# Patient Record
Sex: MALE | Race: WHITE | NOT HISPANIC OR LATINO | Employment: FULL TIME | URBAN - METROPOLITAN AREA
[De-identification: names, ages, dates, MRNs, and addresses within clinical notes are randomized per-mention and may not be internally consistent; named-entity substitution may affect disease eponyms.]

---

## 2018-07-13 ENCOUNTER — OFFICE VISIT (OUTPATIENT)
Dept: ENDOCRINOLOGY | Age: 53
End: 2018-07-13

## 2018-07-13 VITALS
DIASTOLIC BLOOD PRESSURE: 88 MMHG | HEIGHT: 72 IN | BODY MASS INDEX: 42.66 KG/M2 | SYSTOLIC BLOOD PRESSURE: 142 MMHG | WEIGHT: 315 LBS

## 2018-07-13 DIAGNOSIS — E03.9 HYPOTHYROIDISM, UNSPECIFIED TYPE: Primary | ICD-10-CM

## 2018-07-13 DIAGNOSIS — I10 ESSENTIAL HYPERTENSION: ICD-10-CM

## 2018-07-13 DIAGNOSIS — E78.5 HYPERLIPIDEMIA, UNSPECIFIED HYPERLIPIDEMIA TYPE: ICD-10-CM

## 2018-07-13 DIAGNOSIS — R79.89 LOW TESTOSTERONE: ICD-10-CM

## 2018-07-13 DIAGNOSIS — B35.1 TOENAIL FUNGUS: ICD-10-CM

## 2018-07-13 DIAGNOSIS — R63.5 ABNORMAL WEIGHT GAIN: ICD-10-CM

## 2018-07-13 DIAGNOSIS — R73.03 PRE-DIABETES: ICD-10-CM

## 2018-07-13 PROBLEM — R23.4 FISSURE OF SKIN: Status: ACTIVE | Noted: 2018-07-13

## 2018-07-13 PROCEDURE — 99204 OFFICE O/P NEW MOD 45 MIN: CPT | Performed by: NURSE PRACTITIONER

## 2018-07-13 RX ORDER — LEVOTHYROXINE SODIUM 175 UG/1
1 TABLET ORAL DAILY
COMMUNITY
Start: 2018-06-15 | End: 2019-08-30 | Stop reason: SDUPTHER

## 2018-07-13 RX ORDER — CARVEDILOL 25 MG/1
25 TABLET ORAL DAILY
Qty: 30 TABLET | Refills: 5 | Status: SHIPPED | OUTPATIENT
Start: 2018-07-13 | End: 2019-01-07 | Stop reason: SDUPTHER

## 2018-07-13 NOTE — PROGRESS NOTES
"Subjective   Adarsh Davison is a 53 y.o. male is here today for new pt evaluation.   Chief Complaint   Patient presents with   • Thyroid Problem     New Patient, pt is concerned about weight gain.     /88   Ht 182.9 cm (72\")   Wt (!) 151 kg (332 lb)   BMI 45.03 kg/m²   Current Outpatient Prescriptions on File Prior to Visit   Medication Sig   • furosemide (LASIX) 40 MG tablet Take 40 mg by mouth daily.   • lisinopril (PRINIVIL,ZESTRIL) 40 MG tablet Take 40 mg by mouth daily.   • [DISCONTINUED] carvedilol (COREG) 12.5 MG tablet Take 25 mg by mouth Daily.   • [DISCONTINUED] levothyroxine (SYNTHROID, LEVOTHROID) 150 MCG tablet Take 175 mcg by mouth Daily.   • [DISCONTINUED] oxyCODONE-acetaminophen (PERCOCET) 5-325 MG per tablet Take 2 tablets by mouth Every 4 (Four) Hours As Needed (use 1-2 pills for pain).     No current facility-administered medications on file prior to visit.      No family history on file.  Social History   Substance Use Topics   • Smoking status: Never Smoker   • Smokeless tobacco: Never Used   • Alcohol use No     Allergies   Allergen Reactions   • Cialis [Tadalafil] Other (See Comments)     BODY ACHES   • Lortab [Hydrocodone-Acetaminophen] Rash   • Lotrel [Amlodipine Besy-Benazepril Hcl] Rash         History of Present Illness   Encounter Diagnoses   Name Primary?   • Essential hypertension    • Hyperlipidemia, unspecified hyperlipidemia type    • Hypothyroidism, unspecified type Yes   • Low testosterone    • Pre-diabetes    • Abnormal weight gain    • Toenail fungus      This is a 53-year-old male patient here today for initial evaluation of thyroid.  He used to be on testosterone injections.  He is currently on thyroid medication and is taking it as prescribed.  He is noted to be hypertensive at today's visit.  He also has a history of prediabetes and is currently not on any medication for diabetes.  He does have diabetes in his family with his mother.  He also has a positive family " history for cardiovascular disease.  He states he used to get testosterone injections at a physician's office however he had a bill from [employer] $800 and states he can no longer afford testosterone.  We discussed the options of getting himself testosterone injections which would be more affordable.  He's got toenail fungus in both feet and states his sister has been cutting his toenails.  He also is noted to have callus formation and fissures in both feet.  A foot exam was performed at today's visit.  His concern is weight gain and feels he gained approximately 20 pounds recently.  He thinks this is related to his thyroid.  He does complain of fullness in his neck and trouble swallowing.  He does have complaints of fatigue.  His previous medical records have been reviewed.      The following portions of the patient's history were reviewed and updated as appropriate: allergies, current medications, past family history, past medical history, past social history, past surgical history and problem list.    Review of Systems   Constitutional: Positive for fatigue.   HENT: Positive for trouble swallowing.    Eyes: Negative for visual disturbance.   Respiratory: Negative for shortness of breath.    Cardiovascular: Positive for leg swelling.   Endocrine: Negative for polyuria.   Skin: Negative for wound.   Neurological: Negative for numbness.       Objective   Physical Exam   Constitutional: He is oriented to person, place, and time. He appears well-developed and well-nourished. No distress.   obese   HENT:   Head: Normocephalic and atraumatic.   Right Ear: External ear normal.   Left Ear: External ear normal.   Nose: Nose normal.   Eyes: Pupils are equal, round, and reactive to light. Right eye exhibits no discharge. Left eye exhibits no discharge.   Neck: Normal range of motion. Neck supple. Carotid bruit is not present. No tracheal deviation, no edema and no erythema present. Thyromegaly present.    Cardiovascular: Normal rate, regular rhythm, normal heart sounds and intact distal pulses.  Exam reveals no gallop and no friction rub.    No murmur heard.  Pulmonary/Chest: Effort normal and breath sounds normal. No respiratory distress. He has no wheezes. He has no rales.   Abdominal: Soft. Bowel sounds are normal. He exhibits no distension. There is no tenderness.   Musculoskeletal: Normal range of motion. He exhibits no edema or deformity.    Diabetic foot exam performed: exam performed. pt will be referred to podiatry.   During the foot exam he had a monofilament test performed (Decreased sensation at callus formation on both feet).  Vascular Status -  His right foot exhibits abnormal foot edema. His left foot exhibits abnormal foot edema.  Skin Integrity  -  His right foot skin is intact. He has right foot onychomycosis, callous right foot and right heel is dry and cracked.  He has no right foot ulcer and no right foot blister.His left foot skin is intact.He has left foot onychomycosis, callous left foot and left heel dry and cracked. He has no left foot ulcer and no left foot blister..  Lymphadenopathy:     He has no cervical adenopathy.   Neurological: He is alert and oriented to person, place, and time. Coordination normal.   Skin: Skin is warm and dry. No rash noted. He is not diaphoretic. No erythema. No pallor.   Callus and fissures both feet  Skin tan   Psychiatric: He has a normal mood and affect. His behavior is normal. Judgment and thought content normal.   Nursing note and vitals reviewed.    No results found for: TSH, S2WOPLZ, E9PGOAI, THYROIDAB  No results found for: HGBA1C  Lab Results   Component Value Date    GLUCOSE 107 (H) 10/14/2016    BUN 13 10/14/2016    CREATININE 1.05 10/14/2016    EGFRIFNONA 74 10/14/2016    BCR 12.4 10/14/2016    K 4.0 10/14/2016    CO2 25.7 10/14/2016    CALCIUM 9.7 10/14/2016    ALBUMIN 4.20 10/14/2016    LABIL2 1.4 10/14/2016    AST 26 10/14/2016    ALT 34  10/14/2016     No results found for: CHOL, CHLPL, TRIG, HDL, LDL, LDLDIRECT      Assessment/Plan   Problems Addressed this Visit        Cardiovascular and Mediastinum    HTN (hypertension)    Relevant Medications    levothyroxine (SYNTHROID, LEVOTHROID) 175 MCG tablet    carvedilol (COREG) 25 MG tablet    Other Relevant Orders    Ambulatory Referral to Podiatry    Comprehensive Metabolic Panel    C-Peptide    Hemoglobin A1c    Lipid Panel    MicroAlbumin, Urine, Random - Urine, Clean Catch    T3, Free    T4, Free    Thyroid Antibodies    TestT+TestF+SHBG    Thyroid Panel With TSH    Thyroid Peroxidase Antibody    Vitamin D 25 Hydroxy    PSA DIAGNOSTIC    ACTH    Cortisol    FSH & LH    Hyperlipidemia    Relevant Medications    levothyroxine (SYNTHROID, LEVOTHROID) 175 MCG tablet    Other Relevant Orders    Ambulatory Referral to Podiatry    Comprehensive Metabolic Panel    C-Peptide    Hemoglobin A1c    Lipid Panel    MicroAlbumin, Urine, Random - Urine, Clean Catch    T3, Free    T4, Free    Thyroid Antibodies    TestT+TestF+SHBG    Thyroid Panel With TSH    Thyroid Peroxidase Antibody    Vitamin D 25 Hydroxy    PSA DIAGNOSTIC    ACTH    Cortisol    FSH & LH       Endocrine    Hypothyroid - Primary    Relevant Medications    levothyroxine (SYNTHROID, LEVOTHROID) 175 MCG tablet    carvedilol (COREG) 25 MG tablet    Other Relevant Orders    Ambulatory Referral to Podiatry    Comprehensive Metabolic Panel    C-Peptide    Hemoglobin A1c    Lipid Panel    MicroAlbumin, Urine, Random - Urine, Clean Catch    T3, Free    T4, Free    Thyroid Antibodies    TestT+TestF+SHBG    Thyroid Panel With TSH    Thyroid Peroxidase Antibody    Vitamin D 25 Hydroxy    PSA DIAGNOSTIC    ACTH    Cortisol    FSH & LH    Low testosterone    Relevant Medications    levothyroxine (SYNTHROID, LEVOTHROID) 175 MCG tablet    Other Relevant Orders    Ambulatory Referral to Podiatry    Comprehensive Metabolic Panel    C-Peptide    Hemoglobin A1c     Lipid Panel    MicroAlbumin, Urine, Random - Urine, Clean Catch    T3, Free    T4, Free    Thyroid Antibodies    TestT+TestF+SHBG    Thyroid Panel With TSH    Thyroid Peroxidase Antibody    Vitamin D 25 Hydroxy    PSA DIAGNOSTIC    ACTH    Cortisol    FSH & LH       Musculoskeletal and Integument    Toenail fungus       Other    Pre-diabetes    Relevant Medications    levothyroxine (SYNTHROID, LEVOTHROID) 175 MCG tablet    Other Relevant Orders    Ambulatory Referral to Podiatry    Comprehensive Metabolic Panel    C-Peptide    Hemoglobin A1c    Lipid Panel    MicroAlbumin, Urine, Random - Urine, Clean Catch    T3, Free    T4, Free    Thyroid Antibodies    TestT+TestF+SHBG    Thyroid Panel With TSH    Thyroid Peroxidase Antibody    Vitamin D 25 Hydroxy    PSA DIAGNOSTIC    ACTH    Cortisol    FSH & LH    Abnormal weight gain          In summary, patient was seen and examined.  He will have extensive labs done at today's visit will be notified of the results along with any further recommendations.  Currently he is to continue his current thyroid dose of medication.  His blood pressure is not well controlled so his carvedilol has been increased to 25 mg once daily.  He has been asked to monitor his blood pressure.  I've also suggested that he keep a food journal to account for his current caloric intake.  He will be scheduled for a thyroid ultrasound.  He's been referred to podiatry for further evaluation of his feet.  He will follow-up with me in 6 months with labs.  Podiatry referral for fissures and toenail fungus parag and [place]  Thyroid u/s to eval  Monitor bp  Increase carvedilol to 25 mg daily  Labs pending

## 2018-07-13 NOTE — PATIENT INSTRUCTIONS
Podiatry referral for fissures and toenail fungus parag and [place]  Thyroid u/s to eval  Monitor bp  Increase carvedilol to 25 mg daily  Labs pending

## 2018-07-19 PROBLEM — E11.9 CONTROLLED TYPE 2 DIABETES MELLITUS WITHOUT COMPLICATION, WITHOUT LONG-TERM CURRENT USE OF INSULIN (HCC): Status: ACTIVE | Noted: 2018-07-19

## 2018-07-19 LAB
25(OH)D3+25(OH)D2 SERPL-MCNC: 35 NG/ML (ref 30–100)
ACTH PLAS-MCNC: 36.2 PG/ML (ref 7.2–63.3)
ALBUMIN SERPL-MCNC: 4.3 G/DL (ref 3.5–5.2)
ALBUMIN/GLOB SERPL: 1.7 G/DL
ALP SERPL-CCNC: 59 U/L (ref 39–117)
ALT SERPL-CCNC: 25 U/L (ref 1–41)
AST SERPL-CCNC: 24 U/L (ref 1–40)
BILIRUB SERPL-MCNC: 0.4 MG/DL (ref 0.1–1.2)
BUN SERPL-MCNC: 11 MG/DL (ref 6–20)
BUN/CREAT SERPL: 12.8 (ref 7–25)
C PEPTIDE SERPL-MCNC: 14.9 NG/ML (ref 1.1–4.4)
CALCIUM SERPL-MCNC: 9.4 MG/DL (ref 8.6–10.5)
CHLORIDE SERPL-SCNC: 103 MMOL/L (ref 98–107)
CHOLEST SERPL-MCNC: 217 MG/DL (ref 0–200)
CO2 SERPL-SCNC: 24.8 MMOL/L (ref 22–29)
CORTIS SERPL-MCNC: 11.4 UG/DL
CREAT SERPL-MCNC: 0.86 MG/DL (ref 0.76–1.27)
FSH SERPL-ACNC: 4 MIU/ML (ref 1.5–12.4)
FT4I SERPL CALC-MCNC: 2.2 (ref 1.2–4.9)
GLOBULIN SER CALC-MCNC: 2.6 GM/DL
GLUCOSE SERPL-MCNC: 192 MG/DL (ref 65–99)
HBA1C MFR BLD: 6.7 % (ref 4.8–5.6)
HDLC SERPL-MCNC: 33 MG/DL (ref 40–60)
INTERPRETATION: NORMAL
LDLC SERPL CALC-MCNC: 142 MG/DL (ref 0–100)
LH SERPL-ACNC: 5.1 MIU/ML (ref 1.7–8.6)
Lab: NORMAL
MICROALBUMIN UR-MCNC: 14.1 UG/ML
POTASSIUM SERPL-SCNC: 3.8 MMOL/L (ref 3.5–5.2)
PROT SERPL-MCNC: 6.9 G/DL (ref 6–8.5)
PSA SERPL-MCNC: 0.42 NG/ML (ref 0–4)
SHBG SERPL-SCNC: 35.6 NMOL/L (ref 19.3–76.4)
SODIUM SERPL-SCNC: 142 MMOL/L (ref 136–145)
T3FREE SERPL-MCNC: 5.2 PG/ML (ref 2–4.4)
T3RU NFR SERPL: 26 % (ref 24–39)
T4 FREE SERPL-MCNC: 1.37 NG/DL (ref 0.93–1.7)
T4 SERPL-MCNC: 8.4 UG/DL (ref 4.5–12)
TESTOST FREE SERPL-MCNC: 4 PG/ML (ref 7.2–24)
TESTOST SERPL-MCNC: 145 NG/DL (ref 264–916)
THYROGLOB AB SERPL-ACNC: 1.6 IU/ML (ref 0–0.9)
THYROPEROXIDASE AB SERPL-ACNC: 30 IU/ML (ref 0–34)
TRIGL SERPL-MCNC: 211 MG/DL (ref 0–150)
TSH SERPL DL<=0.005 MIU/L-ACNC: 1.7 UIU/ML (ref 0.45–4.5)
VLDLC SERPL CALC-MCNC: 42.2 MG/DL (ref 5–40)

## 2018-07-19 RX ORDER — ROSUVASTATIN CALCIUM 20 MG/1
20 TABLET, COATED ORAL NIGHTLY
Qty: 30 TABLET | Refills: 5 | Status: SHIPPED | OUTPATIENT
Start: 2018-07-19 | End: 2019-01-25

## 2018-07-19 RX ORDER — TESTOSTERONE 40.5 MG/2.5G
GEL TOPICAL
Qty: 75 G | Refills: 0 | OUTPATIENT
Start: 2018-07-19 | End: 2018-07-20

## 2018-07-20 DIAGNOSIS — E03.9 HYPOTHYROIDISM, UNSPECIFIED TYPE: Primary | ICD-10-CM

## 2018-07-20 RX ORDER — TESTOSTERONE CYPIONATE 200 MG/ML
200 INJECTION, SOLUTION INTRAMUSCULAR
Qty: 2 ML | Refills: 0 | OUTPATIENT
Start: 2018-07-20 | End: 2018-10-24 | Stop reason: SDUPTHER

## 2018-07-20 RX ORDER — SYRINGE W-NEEDLE,DISPOSAB,3 ML 25GX5/8"
SYRINGE, EMPTY DISPOSABLE MISCELLANEOUS
Qty: 2 EACH | Refills: 5 | Status: SHIPPED | OUTPATIENT
Start: 2018-07-20 | End: 2019-03-08 | Stop reason: SDUPTHER

## 2018-07-25 ENCOUNTER — TELEPHONE (OUTPATIENT)
Dept: ENDOCRINOLOGY | Age: 53
End: 2018-07-25

## 2018-08-08 ENCOUNTER — TELEPHONE (OUTPATIENT)
Dept: ENDOCRINOLOGY | Age: 53
End: 2018-08-08

## 2018-08-17 ENCOUNTER — HOSPITAL ENCOUNTER (OUTPATIENT)
Dept: ULTRASOUND IMAGING | Facility: HOSPITAL | Age: 53
Discharge: HOME | End: 2018-08-17
Admitting: NURSE PRACTITIONER

## 2018-08-17 DIAGNOSIS — E03.9 HYPOTHYROIDISM, UNSPECIFIED TYPE: ICD-10-CM

## 2018-08-17 PROCEDURE — 76536 US EXAM OF HEAD AND NECK: CPT

## 2018-10-03 ENCOUNTER — TELEPHONE (OUTPATIENT)
Dept: ENDOCRINOLOGY | Age: 53
End: 2018-10-03

## 2018-10-03 NOTE — TELEPHONE ENCOUNTER
----- Message from JUAN Mcgarry sent at 10/3/2018 10:54 AM EDT -----  Contact: Patient  I suggest he see his pcp for cloudy and confusion. That is not a typical side effect of metformin. Any additional medication changes pt should be seen in office   ----- Message -----  From: Kadi Mcknight MA  Sent: 10/3/2018  10:46 AM  To: JUAN Mcgarry        ----- Message -----  From: Molly Zelaya RegSched Rep  Sent: 10/3/2018   9:41 AM  To: BINH Marinelli. Patient is wondering what the dr suggest he do. Stay off of metformin completely, prescribe something else or add a prescription with the metformin.     ----- Message -----  From: Kadi Mcknight MA  Sent: 10/1/2018   1:17 PM  To: Simon Payne    Wondering what?    ----- Message -----  From: Molly Zelaya RegSched Rep  Sent: 9/28/2018   1:50 PM  To: Kadi Mcknight MA    Patient was on metFORMIN (GLUCOPHAGE) 500 MG tablet patient took it for a couple of weeks and started to feel rabia cloudy and confusion and stop taking it for a while. Patient is wondering     Best [phone]              Left patient a message informing him that he should see his PCP for the symptoms listed above. Told the patient that these are not typical side effects of metformin. Told the patient to call the office if he has any questions or concerns.

## 2018-10-24 RX ORDER — TESTOSTERONE CYPIONATE 200 MG/ML
200 INJECTION, SOLUTION INTRAMUSCULAR
Qty: 2 ML | Refills: 0 | OUTPATIENT
Start: 2018-10-24 | End: 2018-10-29 | Stop reason: SDUPTHER

## 2018-10-24 NOTE — TELEPHONE ENCOUNTER
----- Message from Zena Mena sent at 10/24/2018  2:33 PM EDT -----  Contact: patient   Medication :  Testosterone Cypionate (DEPOTESTOTERONE CYPIONATE) 200 MG/ML injection  Message: patient has called in regards to getting the above medication refilled. Please send the medication to the following pharmacy   [address] [facility] [phone]  - [phone]    Patient call back number: [phone]            Refill waiting for approval. Will be called in.

## 2018-10-25 ENCOUNTER — OFFICE VISIT (OUTPATIENT)
Dept: ENDOCRINOLOGY | Age: 53
End: 2018-10-25

## 2018-10-25 VITALS
BODY MASS INDEX: 42.66 KG/M2 | HEIGHT: 72 IN | WEIGHT: 315 LBS | SYSTOLIC BLOOD PRESSURE: 166 MMHG | DIASTOLIC BLOOD PRESSURE: 102 MMHG

## 2018-10-25 DIAGNOSIS — R63.5 ABNORMAL WEIGHT GAIN: ICD-10-CM

## 2018-10-25 DIAGNOSIS — E11.9 CONTROLLED TYPE 2 DIABETES MELLITUS WITHOUT COMPLICATION, WITHOUT LONG-TERM CURRENT USE OF INSULIN (HCC): Primary | ICD-10-CM

## 2018-10-25 DIAGNOSIS — E03.9 HYPOTHYROIDISM, UNSPECIFIED TYPE: ICD-10-CM

## 2018-10-25 DIAGNOSIS — E78.5 HYPERLIPIDEMIA, UNSPECIFIED HYPERLIPIDEMIA TYPE: ICD-10-CM

## 2018-10-25 DIAGNOSIS — I10 ESSENTIAL HYPERTENSION: ICD-10-CM

## 2018-10-25 DIAGNOSIS — R79.89 LOW TESTOSTERONE: ICD-10-CM

## 2018-10-25 DIAGNOSIS — E78.5 DYSLIPIDEMIA WITH HIGH LDL AND LOW HDL: ICD-10-CM

## 2018-10-25 PROBLEM — R91.8 MULTIPLE LUNG NODULES ON CT: Status: ACTIVE | Noted: 2018-09-07

## 2018-10-25 PROCEDURE — 99214 OFFICE O/P EST MOD 30 MIN: CPT | Performed by: NURSE PRACTITIONER

## 2018-10-25 RX ORDER — CLONIDINE HYDROCHLORIDE 0.1 MG/1
0.1 TABLET ORAL 2 TIMES DAILY
Qty: 60 TABLET | Refills: 5 | Status: SHIPPED | OUTPATIENT
Start: 2018-10-25 | End: 2019-04-24 | Stop reason: SDUPTHER

## 2018-10-25 RX ORDER — LISINOPRIL 40 MG/1
40 TABLET ORAL DAILY
Qty: 30 TABLET | Refills: 5 | Status: SHIPPED | OUTPATIENT
Start: 2018-10-25 | End: 2019-07-26 | Stop reason: SDUPTHER

## 2018-10-25 NOTE — PROGRESS NOTES
"Subjective   Adarsh Davison is a 53 y.o. male is here today for follow-up.  Chief Complaint   Patient presents with   • Hypothyroidism     Recent labs   • Hypertension     pt has concerns about metformin, he states that is caused him to be forgetful and confused.    • Hyperlipidemia   • Hypogonadism   • Prediabetes     BP (!) 144/108   Ht 182.9 cm (72\")   Wt (!) 159 kg (349 lb 12.8 oz)   BMI 47.44 kg/m²   Current Outpatient Prescriptions on File Prior to Visit   Medication Sig   • carvedilol (COREG) 25 MG tablet Take 1 tablet by mouth Daily.   • furosemide (LASIX) 40 MG tablet Take 40 mg by mouth daily.   • levothyroxine (SYNTHROID, LEVOTHROID) 175 MCG tablet Take 1 tablet by mouth Daily.   • lisinopril (PRINIVIL,ZESTRIL) 40 MG tablet Take 40 mg by mouth daily.   • rosuvastatin (CRESTOR) 20 MG tablet Take 1 tablet by mouth Every Night.   • Syringe 23G X 1\" 3 ML misc Use as directed for testosterone admin   • Testosterone Cypionate (DEPOTESTOTERONE CYPIONATE) 200 MG/ML injection Inject 1 mL into the appropriate muscle as directed by prescriber Every 14 (Fourteen) Days.   • [DISCONTINUED] metFORMIN (GLUCOPHAGE) 500 MG tablet Take 1 tablet by mouth 2 (Two) Times a Day With Meals.     No current facility-administered medications on file prior to visit.      No family history on file.  Social History   Substance Use Topics   • Smoking status: Never Smoker   • Smokeless tobacco: Never Used   • Alcohol use No     Allergies   Allergen Reactions   • Cialis [Tadalafil] Other (See Comments)     BODY ACHES   • Metformin Confusion   • Lortab [Hydrocodone-Acetaminophen] Rash   • Lotrel [Amlodipine Besy-Benazepril Hcl] Rash         History of Present Illness  Encounter Diagnoses   Name Primary?   • Essential hypertension    • Hyperlipidemia, unspecified hyperlipidemia type    • Controlled type 2 diabetes mellitus without complication, without long-term current use of insulin (CMS/[facility]) Yes   • Hypothyroidism, unspecified type  "   • Dyslipidemia with high LDL and low HDL    • Low testosterone    • Abnormal weight gain    This is a 53-year-old male patient here today for routine follow-up visit.  He is being seen for the above-mentioned problems.  He states he is taking his medications as prescribed.  He has not had recent labs done.  He is recently  within the past year.  His blood pressure is extremely elevated at today's visit.  He is allergic to amlodipine according to his medical record.  His medication list was reviewed and updated at today's visit.  He has overall no complaints.  He has had significant weight gain since his last visit.  He denies any changes in his diet or exercise.  He is questioning whether or not this could be thyroid related.  He also quit taking metformin because he states was causing confusion and since she stopped taking it his mental processes have improved.    The following portions of the patient's history were reviewed and updated as appropriate: allergies, current medications, past family history, past medical history, past social history, past surgical history and problem list.    Review of Systems   Constitutional: Negative for fatigue.   HENT: Negative for trouble swallowing.    Eyes: Negative for visual disturbance.   Respiratory: Negative for shortness of breath.    Cardiovascular: Negative for leg swelling.   Endocrine: Negative for polyuria.   Skin: Negative for wound.   Neurological: Negative for numbness.       Objective   Physical Exam   Constitutional: He is oriented to person, place, and time. He appears well-developed and well-nourished. No distress.   obese   HENT:   Head: Normocephalic and atraumatic.   Right Ear: External ear normal.   Left Ear: External ear normal.   Nose: Nose normal.   Eyes: Pupils are equal, round, and reactive to light. Right eye exhibits no discharge. Left eye exhibits no discharge.   Neck: Normal range of motion. Neck supple. Carotid bruit is not present. No  tracheal deviation, no edema and no erythema present. Thyromegaly present.   Cardiovascular: Normal rate, regular rhythm, normal heart sounds and intact distal pulses.  Exam reveals no gallop and no friction rub.    No murmur heard.  Pulmonary/Chest: Effort normal and breath sounds normal. No respiratory distress. He has no wheezes. He has no rales.   Abdominal: Soft. Bowel sounds are normal. He exhibits no distension. There is no tenderness.   Musculoskeletal: Normal range of motion. He exhibits no edema or deformity.      During the foot exam he had a monofilament test not performed.  Vascular Status -  His right foot exhibits abnormal foot edema. His left foot exhibits abnormal foot edema.  Skin Integrity  -  His right foot skin is intact. He has right foot onychomycosis, callous right foot and right heel is dry and cracked.  He has no right foot ulcer and no right foot blister.His left foot skin is intact.He has left foot onychomycosis, callous left foot and left heel dry and cracked. He has no left foot ulcer and no left foot blister..  Lymphadenopathy:     He has no cervical adenopathy.   Neurological: He is alert and oriented to person, place, and time. Coordination normal.   Skin: Skin is warm and dry. No rash noted. He is not diaphoretic. No erythema. No pallor.   Callus and fissures both feet  Skin tan   Psychiatric: He has a normal mood and affect. His behavior is normal. Judgment and thought content normal.   Nursing note and vitals reviewed.    Office Visit on 07/13/2018   Component Date Value Ref Range Status   • Glucose 07/13/2018 192* 65 - 99 mg/dL Final   • BUN 07/13/2018 11  6 - 20 mg/dL Final   • Creatinine 07/13/2018 0.86  0.76 - 1.27 mg/dL Final   • eGFR Non  Am 07/13/2018 93  >60 mL/min/1.73 Final   • eGFR African Am 07/13/2018 113  >60 mL/min/1.73 Final   • BUN/Creatinine Ratio 07/13/2018 12.8  7.0 - 25.0 Final   • Sodium 07/13/2018 142  136 - 145 mmol/L Final   • Potassium 07/13/2018  3.8  3.5 - 5.2 mmol/L Final   • Chloride 07/13/2018 103  98 - 107 mmol/L Final   • Total CO2 07/13/2018 24.8  22.0 - 29.0 mmol/L Final   • Calcium 07/13/2018 9.4  8.6 - 10.5 mg/dL Final   • Total Protein 07/13/2018 6.9  6.0 - 8.5 g/dL Final   • Albumin 07/13/2018 4.30  3.50 - 5.20 g/dL Final   • Globulin 07/13/2018 2.6  gm/dL Final   • A/G Ratio 07/13/2018 1.7  g/dL Final   • Total Bilirubin 07/13/2018 0.4  0.1 - 1.2 mg/dL Final   • Alkaline Phosphatase 07/13/2018 59  39 - 117 U/L Final   • AST (SGOT) 07/13/2018 24  1 - 40 U/L Final   • ALT (SGPT) 07/13/2018 25  1 - 41 U/L Final   • C-Peptide 07/13/2018 14.9* 1.1 - 4.4 ng/mL Final    C-Peptide reference interval is for fasting patients.   • Hemoglobin A1C 07/13/2018 6.70* 4.80 - 5.60 % Final    Comment: Hemoglobin A1C Ranges:  Increased Risk for Diabetes  5.7% to 6.4%  Diabetes                     >= 6.5%  Diabetic Goal                < 7.0%     • Total Cholesterol 07/13/2018 217* 0 - 200 mg/dL Final   • Triglycerides 07/13/2018 211* 0 - 150 mg/dL Final   • HDL Cholesterol 07/13/2018 33* 40 - 60 mg/dL Final   • VLDL Cholesterol 07/13/2018 42.2* 5 - 40 mg/dL Final   • LDL Cholesterol  07/13/2018 142* 0 - 100 mg/dL Final   • Microalbumin, Urine 07/13/2018 14.1  Not Estab. ug/mL Final   • T3, Free 07/13/2018 5.2* 2.0 - 4.4 pg/mL Final   • Free T4 07/13/2018 1.37  0.93 - 1.70 ng/dL Final   • Thyroid Peroxidase Antibody 07/13/2018 30  0 - 34 IU/mL Final   • Thyroglobulin Ab 07/13/2018 1.6* 0.0 - 0.9 IU/mL Final    Thyroglobulin Antibody measured by [facility] Methodology   • Testosterone, Total 07/13/2018 145* 264 - 916 ng/dL Final    Comment: Adult male reference interval is based on a population of  healthy nonobese males (BMI <30) between 19 and 39 years old.  Tamika et.al. JCEM 2017,102;4662-5241. PMID: 91824547.     • Testosterone, Free 07/13/2018 4.0* 7.2 - 24.0 pg/mL Final   • Sex Hormone Binding Globulin 07/13/2018 35.6  19.3 - 76.4 nmol/L Final   • TSH  07/13/2018 1.700  0.450 - 4.500 uIU/mL Final   • T4, Total 07/13/2018 8.4  4.5 - 12.0 ug/dL Final   • T3 Uptake 07/13/2018 26  24 - 39 % Final   • Free Thyroxine Index 07/13/2018 2.2  1.2 - 4.9 Final   • 25 Hydroxy, Vitamin D 07/13/2018 35.0  30.0 - 100.0 ng/ml Final    Comment: Reference Range for Total Vitamin D 25(OH)  Deficiency    <20.0 ng/mL  Insufficiency 21-29 ng/mL  Sufficiency    ng/mL  Toxicity      >100 ng/ml        • PSA 07/13/2018 0.423  0.000 - 4.000 ng/mL Final   • ACTH 07/13/2018 36.2  7.2 - 63.3 pg/mL Final    ACTH reference interval for samples collected between 7 and 10 AM.   • Cortisol 07/13/2018 11.4  ug/dL Final    Comment:                         Cortisol AM         6.2 - 19.4                          Cortisol PM         2.3 - 11.9     • LH 07/13/2018 5.1  1.7 - 8.6 mIU/mL Final   • FSH 07/13/2018 4.0  1.5 - 12.4 mIU/mL Final   • Interpretation 07/13/2018 Note   Final    Supplemental report is available.   • PDF Image 07/13/2018 Not applicable   Final         Assessment/Plan   Problems Addressed this Visit        Cardiovascular and Mediastinum    HTN (hypertension)    Relevant Medications    lisinopril (PRINIVIL,ZESTRIL) 40 MG tablet    CloNIDine (CATAPRES) 0.1 MG tablet    Other Relevant Orders    Comprehensive Metabolic Panel    C-Peptide    Hemoglobin A1c    Lipid Panel    T3, Free    T4, Free    Thyroid Antibodies    Thyroid Panel With TSH    Thyroid Peroxidase Antibody    Vitamin D 25 Hydroxy    Uric Acid    MicroAlbumin, Urine, Random - Urine, Clean Catch    Hemoglobin & Hematocrit, Blood    TestT+TestF+SHBG    PSA DIAGNOSTIC    Hyperlipidemia    Relevant Medications    lisinopril (PRINIVIL,ZESTRIL) 40 MG tablet    CloNIDine (CATAPRES) 0.1 MG tablet    Other Relevant Orders    Comprehensive Metabolic Panel    C-Peptide    Hemoglobin A1c    Lipid Panel    T3, Free    T4, Free    Thyroid Antibodies    Thyroid Panel With TSH    Thyroid Peroxidase Antibody    Vitamin D 25 Hydroxy     Uric Acid    MicroAlbumin, Urine, Random - Urine, Clean Catch    Hemoglobin & Hematocrit, Blood    TestT+TestF+SHBG    PSA DIAGNOSTIC       Endocrine    Hypothyroid    Relevant Medications    lisinopril (PRINIVIL,ZESTRIL) 40 MG tablet    CloNIDine (CATAPRES) 0.1 MG tablet    Other Relevant Orders    Comprehensive Metabolic Panel    C-Peptide    Hemoglobin A1c    Lipid Panel    T3, Free    T4, Free    Thyroid Antibodies    Thyroid Panel With TSH    Thyroid Peroxidase Antibody    Vitamin D 25 Hydroxy    Uric Acid    MicroAlbumin, Urine, Random - Urine, Clean Catch    Hemoglobin & Hematocrit, Blood    TestT+TestF+SHBG    PSA DIAGNOSTIC    Controlled type 2 diabetes mellitus without complication, without long-term current use of insulin (CMS/[facility]) - Primary    Relevant Medications    lisinopril (PRINIVIL,ZESTRIL) 40 MG tablet    CloNIDine (CATAPRES) 0.1 MG tablet    Other Relevant Orders    Comprehensive Metabolic Panel    C-Peptide    Hemoglobin A1c    Lipid Panel    T3, Free    T4, Free    Thyroid Antibodies    Thyroid Panel With TSH    Thyroid Peroxidase Antibody    Vitamin D 25 Hydroxy    Uric Acid    MicroAlbumin, Urine, Random - Urine, Clean Catch    Hemoglobin & Hematocrit, Blood    TestT+TestF+SHBG    PSA DIAGNOSTIC       Other    Dyslipidemia with high LDL and low HDL    Relevant Medications    lisinopril (PRINIVIL,ZESTRIL) 40 MG tablet    CloNIDine (CATAPRES) 0.1 MG tablet    Other Relevant Orders    Comprehensive Metabolic Panel    C-Peptide    Hemoglobin A1c    Lipid Panel    T3, Free    T4, Free    Thyroid Antibodies    Thyroid Panel With TSH    Thyroid Peroxidase Antibody    Vitamin D 25 Hydroxy    Uric Acid    MicroAlbumin, Urine, Random - Urine, Clean Catch    Hemoglobin & Hematocrit, Blood    TestT+TestF+SHBG    PSA DIAGNOSTIC    Low testosterone    Relevant Medications    lisinopril (PRINIVIL,ZESTRIL) 40 MG tablet    CloNIDine (CATAPRES) 0.1 MG tablet    Other Relevant Orders    Comprehensive Metabolic  Panel    C-Peptide    Hemoglobin A1c    Lipid Panel    T3, Free    T4, Free    Thyroid Antibodies    Thyroid Panel With TSH    Thyroid Peroxidase Antibody    Vitamin D 25 Hydroxy    Uric Acid    MicroAlbumin, Urine, Random - Urine, Clean Catch    Hemoglobin & Hematocrit, Blood    TestT+TestF+SHBG    PSA DIAGNOSTIC    Abnormal weight gain    Relevant Medications    lisinopril (PRINIVIL,ZESTRIL) 40 MG tablet    CloNIDine (CATAPRES) 0.1 MG tablet    Other Relevant Orders    Comprehensive Metabolic Panel    C-Peptide    Hemoglobin A1c    Lipid Panel    T3, Free    T4, Free    Thyroid Antibodies    Thyroid Panel With TSH    Thyroid Peroxidase Antibody    Vitamin D 25 Hydroxy    Uric Acid    MicroAlbumin, Urine, Random - Urine, Clean Catch    Hemoglobin & Hematocrit, Blood    TestT+TestF+SHBG    PSA DIAGNOSTIC        In summary, patient was seen and examined.  He will have extensive labs done at today's visit will be notified of the results along with any further recommendations.  He has been advised to monitor his blood pressure.  I've added Catapres 0.1 mg twice daily.  He's been advised to contact the office if his blood pressure failed to improve.  He will continue all his current medications as prescribed pending his labs.  He is to follow-up in 4 months.   Catapres 0.1 mg twice daily for blood pressure  Monitor bp at home  Labs pending  Be sure you are taking meds as prescribed.

## 2018-10-25 NOTE — PATIENT INSTRUCTIONS
Catapres 0.1 mg twice daily for blood pressure  Monitor bp at home  Labs pending  Be sure you are taking meds as prescribed.

## 2018-10-27 LAB
25(OH)D3+25(OH)D2 SERPL-MCNC: 30.8 NG/ML (ref 30–100)
ALBUMIN SERPL-MCNC: 4.3 G/DL (ref 3.5–5.2)
ALBUMIN/GLOB SERPL: 1.5 G/DL
ALP SERPL-CCNC: 62 U/L (ref 39–117)
ALT SERPL-CCNC: 24 U/L (ref 1–41)
AST SERPL-CCNC: 25 U/L (ref 1–40)
BILIRUB SERPL-MCNC: 0.3 MG/DL (ref 0.1–1.2)
BUN SERPL-MCNC: 12 MG/DL (ref 6–20)
BUN/CREAT SERPL: 12.9 (ref 7–25)
C PEPTIDE SERPL-MCNC: 9.8 NG/ML (ref 1.1–4.4)
CALCIUM SERPL-MCNC: 9.6 MG/DL (ref 8.6–10.5)
CHLORIDE SERPL-SCNC: 101 MMOL/L (ref 98–107)
CHOLEST SERPL-MCNC: 126 MG/DL (ref 0–200)
CO2 SERPL-SCNC: 22.8 MMOL/L (ref 22–29)
CREAT SERPL-MCNC: 0.93 MG/DL (ref 0.76–1.27)
FT4I SERPL CALC-MCNC: 1.8 (ref 1.2–4.9)
GLOBULIN SER CALC-MCNC: 2.9 GM/DL
GLUCOSE SERPL-MCNC: 143 MG/DL (ref 65–99)
HBA1C MFR BLD: 7.5 % (ref 4.8–5.6)
HCT VFR BLD AUTO: 44.8 % (ref 40.4–52.2)
HDLC SERPL-MCNC: 39 MG/DL (ref 40–60)
HGB BLD-MCNC: 14.7 G/DL (ref 13.7–17.6)
INTERPRETATION: NORMAL
LDLC SERPL CALC-MCNC: 49 MG/DL (ref 0–100)
Lab: NORMAL
MICROALBUMIN UR-MCNC: 9.4 UG/ML
POTASSIUM SERPL-SCNC: 4 MMOL/L (ref 3.5–5.2)
PROT SERPL-MCNC: 7.2 G/DL (ref 6–8.5)
PSA SERPL-MCNC: 0.59 NG/ML (ref 0–4)
SHBG SERPL-SCNC: 31.6 NMOL/L (ref 19.3–76.4)
SODIUM SERPL-SCNC: 140 MMOL/L (ref 136–145)
T3FREE SERPL-MCNC: 4 PG/ML (ref 2–4.4)
T3RU NFR SERPL: 26 % (ref 24–39)
T4 FREE SERPL-MCNC: 1.11 NG/DL (ref 0.93–1.7)
T4 SERPL-MCNC: 6.9 UG/DL (ref 4.5–12)
TESTOST FREE SERPL-MCNC: 3.7 PG/ML (ref 7.2–24)
TESTOST SERPL-MCNC: 131 NG/DL (ref 264–916)
THYROGLOB AB SERPL-ACNC: 1.2 IU/ML (ref 0–0.9)
THYROPEROXIDASE AB SERPL-ACNC: 30 IU/ML (ref 0–34)
TRIGL SERPL-MCNC: 190 MG/DL (ref 0–150)
TSH SERPL DL<=0.005 MIU/L-ACNC: 2.47 UIU/ML (ref 0.45–4.5)
URATE SERPL-MCNC: 6.3 MG/DL (ref 3.4–7)
VLDLC SERPL CALC-MCNC: 38 MG/DL (ref 5–40)

## 2018-10-29 PROBLEM — E55.9 VITAMIN D DEFICIENCY: Status: ACTIVE | Noted: 2018-10-29

## 2018-10-29 RX ORDER — ERGOCALCIFEROL 1.25 MG/1
CAPSULE ORAL
Qty: 12 CAPSULE | Refills: 1 | Status: SHIPPED | OUTPATIENT
Start: 2018-10-29 | End: 2019-01-25

## 2018-10-29 RX ORDER — TESTOSTERONE CYPIONATE 200 MG/ML
INJECTION, SOLUTION INTRAMUSCULAR
Qty: 3 ML | Refills: 0 | OUTPATIENT
Start: 2018-10-29 | End: 2019-01-11 | Stop reason: SDUPTHER

## 2018-10-30 ENCOUNTER — TELEPHONE (OUTPATIENT)
Dept: ENDOCRINOLOGY | Age: 53
End: 2018-10-30

## 2018-10-30 NOTE — TELEPHONE ENCOUNTER
Left pt message informing him that A1c has gone up, start glyxambi 10/5 once daily to lower blood sugars. Start vitamin D 50,000 once weekly, continue all current medications, and be sure to take BP meds and monitor it due to BP being high at last visit. Told patient to call the office with any questions or concerns.

## 2019-01-07 RX ORDER — CARVEDILOL 25 MG/1
TABLET ORAL
Qty: 30 TABLET | Refills: 4 | Status: CANCELLED | OUTPATIENT
Start: 2019-01-07

## 2019-01-07 RX ORDER — CARVEDILOL 25 MG/1
25 TABLET ORAL DAILY
Qty: 30 TABLET | Refills: 0 | Status: SHIPPED | OUTPATIENT
Start: 2019-01-07 | End: 2019-01-14 | Stop reason: SDUPTHER

## 2019-01-08 DIAGNOSIS — R79.89 LOW TESTOSTERONE: ICD-10-CM

## 2019-01-08 DIAGNOSIS — E55.9 VITAMIN D DEFICIENCY: ICD-10-CM

## 2019-01-08 DIAGNOSIS — E78.5 HYPERLIPIDEMIA, UNSPECIFIED HYPERLIPIDEMIA TYPE: Primary | ICD-10-CM

## 2019-01-08 DIAGNOSIS — E11.9 CONTROLLED TYPE 2 DIABETES MELLITUS WITHOUT COMPLICATION, WITHOUT LONG-TERM CURRENT USE OF INSULIN (HCC): ICD-10-CM

## 2019-01-11 ENCOUNTER — TELEPHONE (OUTPATIENT)
Dept: ENDOCRINOLOGY | Age: 54
End: 2019-01-11

## 2019-01-11 RX ORDER — TESTOSTERONE CYPIONATE 200 MG/ML
INJECTION, SOLUTION INTRAMUSCULAR
Qty: 3 ML | Refills: 0 | Status: SHIPPED | OUTPATIENT
Start: 2019-01-11 | End: 2019-02-07 | Stop reason: SDUPTHER

## 2019-01-11 NOTE — TELEPHONE ENCOUNTER
rx sent to pharmacy    ----- Message from Pretty Riley sent at 1/11/2019  8:16 AM EST -----  Contact: PT  PT IS NEEDING HIS TESTOSTERONE 200MG/ML INJECT 1CC IM EVERY 10DAYS SENT TO PHARMACY.  PHARMACY IS IN CHART.

## 2019-01-14 RX ORDER — CARVEDILOL 25 MG/1
25 TABLET ORAL DAILY
Qty: 30 TABLET | Refills: 0 | Status: SHIPPED | OUTPATIENT
Start: 2019-01-14 | End: 2019-07-11 | Stop reason: SDUPTHER

## 2019-01-14 RX ORDER — CARVEDILOL 25 MG/1
TABLET ORAL
Qty: 30 TABLET | Refills: 4 | OUTPATIENT
Start: 2019-01-14

## 2019-01-18 ENCOUNTER — LAB (OUTPATIENT)
Dept: ENDOCRINOLOGY | Age: 54
End: 2019-01-18

## 2019-01-18 DIAGNOSIS — E55.9 VITAMIN D DEFICIENCY: ICD-10-CM

## 2019-01-18 DIAGNOSIS — E11.9 CONTROLLED TYPE 2 DIABETES MELLITUS WITHOUT COMPLICATION, WITHOUT LONG-TERM CURRENT USE OF INSULIN (HCC): ICD-10-CM

## 2019-01-18 DIAGNOSIS — E78.5 HYPERLIPIDEMIA, UNSPECIFIED HYPERLIPIDEMIA TYPE: ICD-10-CM

## 2019-01-18 DIAGNOSIS — R79.89 LOW TESTOSTERONE: ICD-10-CM

## 2019-01-19 LAB
25(OH)D3+25(OH)D2 SERPL-MCNC: 26.9 NG/ML (ref 30–100)
ALBUMIN SERPL-MCNC: 3.9 G/DL (ref 3.5–5.2)
ALBUMIN/GLOB SERPL: 1.5 G/DL
ALP SERPL-CCNC: 52 U/L (ref 39–117)
ALT SERPL-CCNC: 20 U/L (ref 1–41)
AST SERPL-CCNC: 20 U/L (ref 1–40)
BILIRUB SERPL-MCNC: 0.3 MG/DL (ref 0.1–1.2)
BUN SERPL-MCNC: 16 MG/DL (ref 6–20)
BUN/CREAT SERPL: 21.1 (ref 7–25)
C PEPTIDE SERPL-MCNC: 12.1 NG/ML (ref 1.1–4.4)
CALCIUM SERPL-MCNC: 9.3 MG/DL (ref 8.6–10.5)
CHLORIDE SERPL-SCNC: 103 MMOL/L (ref 98–107)
CHOLEST SERPL-MCNC: 161 MG/DL (ref 0–200)
CO2 SERPL-SCNC: 27.3 MMOL/L (ref 22–29)
CREAT SERPL-MCNC: 0.76 MG/DL (ref 0.76–1.27)
GLOBULIN SER CALC-MCNC: 2.6 GM/DL
GLUCOSE SERPL-MCNC: 152 MG/DL (ref 65–99)
HBA1C MFR BLD: 7.86 % (ref 4.8–5.6)
HCT VFR BLD AUTO: 45.6 % (ref 40.4–52.2)
HDLC SERPL-MCNC: 34 MG/DL (ref 40–60)
HGB BLD-MCNC: 14.6 G/DL (ref 13.7–17.6)
INTERPRETATION: NORMAL
LDLC SERPL CALC-MCNC: 66 MG/DL (ref 0–100)
Lab: NORMAL
POTASSIUM SERPL-SCNC: 4.6 MMOL/L (ref 3.5–5.2)
PROT SERPL-MCNC: 6.5 G/DL (ref 6–8.5)
PSA SERPL-MCNC: 0.31 NG/ML (ref 0–4)
SHBG SERPL-SCNC: 30.7 NMOL/L (ref 19.3–76.4)
SODIUM SERPL-SCNC: 140 MMOL/L (ref 136–145)
TESTOST FREE SERPL-MCNC: 2.5 PG/ML (ref 7.2–24)
TESTOST SERPL-MCNC: 57 NG/DL (ref 264–916)
TRIGL SERPL-MCNC: 304 MG/DL (ref 0–150)
VLDLC SERPL CALC-MCNC: 60.8 MG/DL (ref 5–40)

## 2019-01-25 ENCOUNTER — OFFICE VISIT (OUTPATIENT)
Dept: ENDOCRINOLOGY | Age: 54
End: 2019-01-25

## 2019-01-25 VITALS
HEIGHT: 72 IN | WEIGHT: 315 LBS | SYSTOLIC BLOOD PRESSURE: 136 MMHG | RESPIRATION RATE: 16 BRPM | BODY MASS INDEX: 42.66 KG/M2 | DIASTOLIC BLOOD PRESSURE: 86 MMHG

## 2019-01-25 DIAGNOSIS — E78.5 HYPERLIPIDEMIA, UNSPECIFIED HYPERLIPIDEMIA TYPE: ICD-10-CM

## 2019-01-25 DIAGNOSIS — I10 ESSENTIAL HYPERTENSION: ICD-10-CM

## 2019-01-25 DIAGNOSIS — E55.9 VITAMIN D DEFICIENCY: ICD-10-CM

## 2019-01-25 DIAGNOSIS — E11.9 CONTROLLED TYPE 2 DIABETES MELLITUS WITHOUT COMPLICATION, WITHOUT LONG-TERM CURRENT USE OF INSULIN (HCC): Primary | ICD-10-CM

## 2019-01-25 DIAGNOSIS — R79.89 LOW TESTOSTERONE: ICD-10-CM

## 2019-01-25 PROCEDURE — 99214 OFFICE O/P EST MOD 30 MIN: CPT | Performed by: NURSE PRACTITIONER

## 2019-01-25 RX ORDER — ROSUVASTATIN CALCIUM 20 MG/1
40 TABLET, COATED ORAL NIGHTLY
Qty: 30 TABLET | Refills: 5 | Status: SHIPPED | OUTPATIENT
Start: 2019-01-25 | End: 2019-08-30 | Stop reason: SDUPTHER

## 2019-01-25 RX ORDER — EMPAGLIFLOZIN 10 MG/1
1 TABLET, FILM COATED ORAL DAILY
Qty: 30 TABLET | Refills: 5 | Status: SHIPPED | OUTPATIENT
Start: 2019-01-25 | End: 2019-08-30

## 2019-01-25 RX ORDER — ERGOCALCIFEROL 1.25 MG/1
CAPSULE ORAL
Qty: 8 CAPSULE | Refills: 5 | Status: SHIPPED | OUTPATIENT
Start: 2019-01-25 | End: 2019-08-30 | Stop reason: SDUPTHER

## 2019-01-25 NOTE — PROGRESS NOTES
"Subjective   Adarsh Davison is a 54 y.o. male is here today for follow-up.  Chief Complaint   Patient presents with   • Diabetes     lab review; not checking BG; laid off work and was off medication x one month. restarted meds x 2 weeks ago; denies problems or concerns   • Hypertension   • Vitamin D Deficiency     /86   Resp 16   Ht 182.9 cm (72\")   Wt (!) 160 kg (352 lb 12.8 oz)   BMI 47.85 kg/m²   Current Outpatient Medications on File Prior to Visit   Medication Sig   • carvedilol (COREG) 25 MG tablet Take 1 tablet by mouth Daily.   • CloNIDine (CATAPRES) 0.1 MG tablet Take 1 tablet by mouth 2 (Two) Times a Day.   • Empagliflozin-Linagliptin (GLYXAMBI) 10-5 MG tablet Take 1 tablet by mouth Daily.   • ergocalciferol (DRISDOL) 45685 units capsule Take 1 po q week   • furosemide (LASIX) 40 MG tablet Take 40 mg by mouth daily.   • levothyroxine (SYNTHROID, LEVOTHROID) 175 MCG tablet Take 1 tablet by mouth Daily.   • lisinopril (PRINIVIL,ZESTRIL) 40 MG tablet Take 1 tablet by mouth Daily.   • rosuvastatin (CRESTOR) 20 MG tablet Take 1 tablet by mouth Every Night.   • Syringe 23G X 1\" 3 ML misc Use as directed for testosterone admin   • Testosterone Cypionate (DEPOTESTOTERONE CYPIONATE) 200 MG/ML injection Take 1 cc IM by injection every 10 days     No current facility-administered medications on file prior to visit.      History reviewed. No pertinent family history.  Social History     Tobacco Use   • Smoking status: Never Smoker   • Smokeless tobacco: Never Used   Substance Use Topics   • Alcohol use: No   • Drug use: No     Allergies   Allergen Reactions   • Cialis [Tadalafil] Other (See Comments)     BODY ACHES   • Metformin Confusion   • Lortab [Hydrocodone-Acetaminophen] Rash   • Lotrel [Amlodipine Besy-Benazepril Hcl] Rash         History of Present Illness  Encounter Diagnoses   Name Primary?   • Essential hypertension    • Hyperlipidemia, unspecified hyperlipidemia type    • Vitamin D deficiency    • " Controlled type 2 diabetes mellitus without complication, without long-term current use of insulin (CMS/[facility]) Yes   • Low testosterone      54 year old male patient being seen today for routine follow up for the above mentioned medical problems. He reports being laid off in December, causing him to temporarily lose his insurance, and because of this, he could not afford his Glyxambi from mid December to mid January. He did start taking it again once his insurance was back in effect. He also reports that he has not been taking his testosterone the past few months. He reports taking all other medications as prescribed. He also reported that he was referred to a podiatrist that he did not care for, and has chosen not to go back to that office. He states that he tries to eat healthy and exercise some, but would like to attend a diabetes class to learn more about healthy eating. He denies symptoms of hypo and hyperglycemia and states that he does not check his blood sugars at home.      The following portions of the patient's history were reviewed and updated as appropriate: allergies, current medications, past family history, past medical history, past social history, past surgical history and problem list.    Review of Systems   Constitutional: Negative for fatigue.   Endocrine: Negative for cold intolerance and heat intolerance.   Psychiatric/Behavioral: Negative for sleep disturbance.       Objective   Physical Exam   Constitutional: He is oriented to person, place, and time. He appears well-developed and well-nourished. No distress.   obesity    HENT:   Head: Normocephalic and atraumatic.   Right Ear: External ear normal.   Left Ear: External ear normal.   Nose: Nose normal.   Eyes: Conjunctivae and EOM are normal. Pupils are equal, round, and reactive to light. Right eye exhibits no discharge. Left eye exhibits no discharge.   Neck: Normal range of motion. Neck supple. Carotid bruit is not present. No tracheal  deviation, no edema and no erythema present. Thyromegaly present.   Cardiovascular: Normal rate, regular rhythm, normal heart sounds and intact distal pulses. Exam reveals no gallop and no friction rub.   No murmur heard.  Pulmonary/Chest: Effort normal and breath sounds normal. No stridor. No respiratory distress. He has no wheezes. He has no rales.   Abdominal: Soft. Bowel sounds are normal. He exhibits no distension. There is no tenderness.   Musculoskeletal: Normal range of motion. He exhibits edema. He exhibits no deformity.   1+ edema to bilateral lower legs and ankles    Adarsh had a diabetic foot exam performed today.   During the foot exam he had a monofilament test performed (decreased sensation bilaterally).  Skin Integrity  -  He has right foot onychomycosis, callous right foot and right heel is dry and cracked.  He has no right foot ulcer and no right foot blister.He has left foot onychomycosis, callous left foot and left heel dry and cracked. He has no left foot ulcer and no left foot blister..  Lymphadenopathy:     He has no cervical adenopathy.   Neurological: He is alert and oriented to person, place, and time. Coordination normal.   Skin: Skin is warm and dry. No rash noted. He is not diaphoretic. No erythema. No pallor.   Calluses on plantar surfaces of feet bilaterally     Psychiatric: He has a normal mood and affect. His behavior is normal. Judgment and thought content normal.   Nursing note and vitals reviewed.    Results for orders placed or performed in visit on 01/18/19   Hemoglobin & Hematocrit, Blood   Result Value Ref Range    Hemoglobin 14.6 13.7 - 17.6 g/dL    Hematocrit 45.6 40.4 - 52.2 %   PSA DIAGNOSTIC   Result Value Ref Range    PSA 0.309 0.000 - 4.000 ng/mL   C-Peptide   Result Value Ref Range    C-Peptide 12.1 (H) 1.1 - 4.4 ng/mL   Vitamin D 25 Hydroxy   Result Value Ref Range    25 Hydroxy, Vitamin D 26.9 (L) 30.0 - 100.0 ng/ml   Hemoglobin A1c   Result Value Ref Range     Hemoglobin A1C 7.86 (H) 4.80 - 5.60 %   Comprehensive Metabolic Panel   Result Value Ref Range    Glucose 152 (H) 65 - 99 mg/dL    BUN 16 6 - 20 mg/dL    Creatinine 0.76 0.76 - 1.27 mg/dL    eGFR Non African Am 107 >60 mL/min/1.73    eGFR African Am 130 >60 mL/min/1.73    BUN/Creatinine Ratio 21.1 7.0 - 25.0    Sodium 140 136 - 145 mmol/L    Potassium 4.6 3.5 - 5.2 mmol/L    Chloride 103 98 - 107 mmol/L    Total CO2 27.3 22.0 - 29.0 mmol/L    Calcium 9.3 8.6 - 10.5 mg/dL    Total Protein 6.5 6.0 - 8.5 g/dL    Albumin 3.90 3.50 - 5.20 g/dL    Globulin 2.6 gm/dL    A/G Ratio 1.5 g/dL    Total Bilirubin 0.3 0.1 - 1.2 mg/dL    Alkaline Phosphatase 52 39 - 117 U/L    AST (SGOT) 20 1 - 40 U/L    ALT (SGPT) 20 1 - 41 U/L   Lipid Panel   Result Value Ref Range    Total Cholesterol 161 0 - 200 mg/dL    Triglycerides 304 (H) 0 - 150 mg/dL    HDL Cholesterol 34 (L) 40 - 60 mg/dL    VLDL Cholesterol 60.8 (H) 5 - 40 mg/dL    LDL Cholesterol  66 0 - 100 mg/dL   TestT+TestF+SHBG   Result Value Ref Range    Testosterone, Total 57 (L) 264 - 916 ng/dL    Testosterone, Free 2.5 (L) 7.2 - 24.0 pg/mL    Sex Hormone Binding Globulin 30.7 19.3 - 76.4 nmol/L   Cardiovascular Risk Assessment   Result Value Ref Range    Interpretation Note    Diabetes Patient Education   Result Value Ref Range    PDF Image Not applicable          Assessment/Plan   Problems Addressed this Visit        Cardiovascular and Mediastinum    HTN (hypertension)    Hyperlipidemia       Digestive    Vitamin D deficiency       Endocrine    Controlled type 2 diabetes mellitus without complication, without long-term current use of insulin (CMS/[facility]) - Primary       Other    Low testosterone        In summary, the patient was seen and examined for the above medical problems. He had recent lab work which was reviewed and a copy was provided to the patient. A Juice was also reviewed today. His blood pressure has improved since his last visit and is within goal range today.  His A1c is above goal, so I have discontinued his Glyxambi and added Jardiance and Trulicity. His Vitamin D was low, so I have increased to Drisdol 50,000 units twice weekly. His triglycerides are elevated so I have increased his Crestor to 40mg daily. I have referred him to a new podiatry office and to diabetes education class. He was instructed on directions for use of Trulicity while in office. He is to begin taking his testosterone again. Continue all other medications as prescribed. He is metabolically stable. He is to return in 6 months with labs prior and he has been encouraged to contact the office should he have any questions or concerns prior to this appointment.       Discontinue Glyxambi  Start Trulicity 0.75mg subcu every week  Start Jardiance 10mg daily  Increase Vit D to twice weekly  Increase Crestor to 40mg  See Mauri for podiatry  Attend diabetes education clas

## 2019-01-25 NOTE — PATIENT INSTRUCTIONS
Discontinue Glyxambi  Start Trulicity 0.75mg subcu every week  Start Jardiance 10mg daily  Increase Vit D to twice weekly  Increase Crestor to 40mg  See Mauri for podiatry  Attend diabetes education class

## 2019-02-07 RX ORDER — CARVEDILOL 25 MG/1
TABLET ORAL
Qty: 30 TABLET | Refills: 4 | Status: SHIPPED | OUTPATIENT
Start: 2019-02-07 | End: 2019-08-30 | Stop reason: SDUPTHER

## 2019-02-07 RX ORDER — TESTOSTERONE CYPIONATE 200 MG/ML
INJECTION, SOLUTION INTRAMUSCULAR
Qty: 3 ML | Refills: 0 | Status: SHIPPED | OUTPATIENT
Start: 2019-02-07 | End: 2019-04-08 | Stop reason: SDUPTHER

## 2019-02-07 RX ORDER — ROSUVASTATIN CALCIUM 20 MG/1
TABLET, COATED ORAL
Qty: 30 TABLET | Refills: 4 | Status: SHIPPED | OUTPATIENT
Start: 2019-02-07 | End: 2019-07-07 | Stop reason: SDUPTHER

## 2019-03-06 ENCOUNTER — HOSPITAL ENCOUNTER (OUTPATIENT)
Dept: DIABETES SERVICES | Facility: HOSPITAL | Age: 54
Setting detail: RECURRING SERIES
Discharge: HOME | End: 2019-03-06

## 2019-03-06 PROCEDURE — G0109 DIAB MANAGE TRN IND/GROUP: HCPCS

## 2019-03-13 ENCOUNTER — HOSPITAL ENCOUNTER (OUTPATIENT)
Dept: DIABETES SERVICES | Facility: HOSPITAL | Age: 54
Setting detail: RECURRING SERIES
Discharge: HOME | End: 2019-03-13

## 2019-03-13 PROCEDURE — G0109 DIAB MANAGE TRN IND/GROUP: HCPCS

## 2019-04-08 ENCOUNTER — TELEPHONE (OUTPATIENT)
Dept: ENDOCRINOLOGY | Age: 54
End: 2019-04-08

## 2019-04-08 RX ORDER — TESTOSTERONE CYPIONATE 200 MG/ML
INJECTION, SOLUTION INTRAMUSCULAR
Qty: 3 ML | Refills: 0 | Status: SHIPPED | OUTPATIENT
Start: 2019-04-08 | End: 2019-05-28 | Stop reason: SDUPTHER

## 2019-04-08 NOTE — TELEPHONE ENCOUNTER
Request has been sent to provider for approval    ----- Message from Simon Howell sent at 4/8/2019 12:24 PM EDT -----  Patient would like to know if he could get a refill sent to [facility] [address] for testosterone vile's and the needles. He can be reached at [phone].    Thanks,  Ashlyn

## 2019-04-24 DIAGNOSIS — R63.5 ABNORMAL WEIGHT GAIN: ICD-10-CM

## 2019-04-24 DIAGNOSIS — R79.89 LOW TESTOSTERONE: ICD-10-CM

## 2019-04-24 DIAGNOSIS — E11.9 CONTROLLED TYPE 2 DIABETES MELLITUS WITHOUT COMPLICATION, WITHOUT LONG-TERM CURRENT USE OF INSULIN (HCC): ICD-10-CM

## 2019-04-24 DIAGNOSIS — E03.9 HYPOTHYROIDISM, UNSPECIFIED TYPE: ICD-10-CM

## 2019-04-24 DIAGNOSIS — E78.5 DYSLIPIDEMIA WITH HIGH LDL AND LOW HDL: ICD-10-CM

## 2019-04-24 DIAGNOSIS — I10 ESSENTIAL HYPERTENSION: ICD-10-CM

## 2019-04-24 DIAGNOSIS — E78.5 HYPERLIPIDEMIA, UNSPECIFIED HYPERLIPIDEMIA TYPE: ICD-10-CM

## 2019-04-24 RX ORDER — CLONIDINE HYDROCHLORIDE 0.1 MG/1
TABLET ORAL
Qty: 60 TABLET | Refills: 4 | Status: SHIPPED | OUTPATIENT
Start: 2019-04-24 | End: 2019-08-30 | Stop reason: SDUPTHER

## 2019-05-26 RX ORDER — TESTOSTERONE CYPIONATE 200 MG/ML
INJECTION, SOLUTION INTRAMUSCULAR
Refills: 0 | Status: CANCELLED | OUTPATIENT
Start: 2019-05-26

## 2019-05-28 RX ORDER — TESTOSTERONE CYPIONATE 200 MG/ML
INJECTION, SOLUTION INTRAMUSCULAR
Qty: 3 ML | Refills: 0 | Status: SHIPPED | OUTPATIENT
Start: 2019-05-28 | End: 2019-07-26 | Stop reason: SDUPTHER

## 2019-06-07 ENCOUNTER — TELEPHONE (OUTPATIENT)
Dept: ENDOCRINOLOGY | Age: 54
End: 2019-06-07

## 2019-06-07 NOTE — TELEPHONE ENCOUNTER
Patient called and stated he needs refill on Trulicity before his company leaves for shut down.  During shut down patients insurance is not effective.

## 2019-07-08 RX ORDER — ROSUVASTATIN CALCIUM 20 MG/1
TABLET, COATED ORAL
Qty: 30 TABLET | Refills: 3 | Status: SHIPPED | OUTPATIENT
Start: 2019-07-08 | End: 2019-08-30 | Stop reason: SDUPTHER

## 2019-07-11 RX ORDER — CARVEDILOL 25 MG/1
25 TABLET ORAL DAILY
Qty: 30 TABLET | Refills: 0 | Status: SHIPPED | OUTPATIENT
Start: 2019-07-11 | End: 2019-08-27 | Stop reason: SDUPTHER

## 2019-07-11 RX ORDER — CARVEDILOL 25 MG/1
TABLET ORAL
Qty: 30 TABLET | Refills: 3 | OUTPATIENT
Start: 2019-07-11

## 2019-07-25 DIAGNOSIS — R79.89 LOW TESTOSTERONE: ICD-10-CM

## 2019-07-25 DIAGNOSIS — E03.9 HYPOTHYROIDISM, UNSPECIFIED TYPE: ICD-10-CM

## 2019-07-25 DIAGNOSIS — E11.9 CONTROLLED TYPE 2 DIABETES MELLITUS WITHOUT COMPLICATION, WITHOUT LONG-TERM CURRENT USE OF INSULIN (HCC): ICD-10-CM

## 2019-07-25 DIAGNOSIS — E78.5 HYPERLIPIDEMIA, UNSPECIFIED HYPERLIPIDEMIA TYPE: ICD-10-CM

## 2019-07-25 DIAGNOSIS — E78.5 DYSLIPIDEMIA WITH HIGH LDL AND LOW HDL: ICD-10-CM

## 2019-07-25 DIAGNOSIS — R63.5 ABNORMAL WEIGHT GAIN: ICD-10-CM

## 2019-07-25 DIAGNOSIS — I10 ESSENTIAL HYPERTENSION: ICD-10-CM

## 2019-07-25 RX ORDER — LISINOPRIL 40 MG/1
TABLET ORAL
Qty: 30 TABLET | Refills: 4 | Status: CANCELLED | OUTPATIENT
Start: 2019-07-25

## 2019-07-25 RX ORDER — TESTOSTERONE CYPIONATE 200 MG/ML
INJECTION, SOLUTION INTRAMUSCULAR
Refills: 0 | Status: CANCELLED | OUTPATIENT
Start: 2019-07-25

## 2019-07-26 DIAGNOSIS — E78.5 DYSLIPIDEMIA WITH HIGH LDL AND LOW HDL: ICD-10-CM

## 2019-07-26 DIAGNOSIS — E78.5 HYPERLIPIDEMIA, UNSPECIFIED HYPERLIPIDEMIA TYPE: ICD-10-CM

## 2019-07-26 DIAGNOSIS — E11.9 CONTROLLED TYPE 2 DIABETES MELLITUS WITHOUT COMPLICATION, WITHOUT LONG-TERM CURRENT USE OF INSULIN (HCC): ICD-10-CM

## 2019-07-26 DIAGNOSIS — E03.9 HYPOTHYROIDISM, UNSPECIFIED TYPE: ICD-10-CM

## 2019-07-26 DIAGNOSIS — R63.5 ABNORMAL WEIGHT GAIN: ICD-10-CM

## 2019-07-26 DIAGNOSIS — I10 ESSENTIAL HYPERTENSION: ICD-10-CM

## 2019-07-26 DIAGNOSIS — R79.89 LOW TESTOSTERONE: ICD-10-CM

## 2019-07-26 RX ORDER — TESTOSTERONE CYPIONATE 200 MG/ML
INJECTION, SOLUTION INTRAMUSCULAR
Qty: 3 ML | Refills: 0 | Status: SHIPPED | OUTPATIENT
Start: 2019-07-26 | End: 2019-08-30

## 2019-07-26 RX ORDER — LISINOPRIL 40 MG/1
40 TABLET ORAL DAILY
Qty: 30 TABLET | Refills: 0 | Status: SHIPPED | OUTPATIENT
Start: 2019-07-26 | End: 2019-08-30 | Stop reason: SDUPTHER

## 2019-08-27 RX ORDER — CARVEDILOL 25 MG/1
TABLET ORAL
Qty: 30 TABLET | Refills: 0 | Status: SHIPPED | OUTPATIENT
Start: 2019-08-27 | End: 2019-08-30 | Stop reason: SDUPTHER

## 2019-08-30 ENCOUNTER — OFFICE VISIT (OUTPATIENT)
Dept: ENDOCRINOLOGY | Age: 54
End: 2019-08-30

## 2019-08-30 VITALS
WEIGHT: 315 LBS | DIASTOLIC BLOOD PRESSURE: 78 MMHG | HEIGHT: 72 IN | BODY MASS INDEX: 42.66 KG/M2 | SYSTOLIC BLOOD PRESSURE: 130 MMHG

## 2019-08-30 DIAGNOSIS — E78.5 DYSLIPIDEMIA WITH HIGH LDL AND LOW HDL: ICD-10-CM

## 2019-08-30 DIAGNOSIS — R73.03 PRE-DIABETES: ICD-10-CM

## 2019-08-30 DIAGNOSIS — R63.5 ABNORMAL WEIGHT GAIN: ICD-10-CM

## 2019-08-30 DIAGNOSIS — E03.9 PRIMARY HYPOTHYROIDISM: ICD-10-CM

## 2019-08-30 DIAGNOSIS — R79.89 LOW TESTOSTERONE: ICD-10-CM

## 2019-08-30 DIAGNOSIS — I10 ESSENTIAL HYPERTENSION: ICD-10-CM

## 2019-08-30 DIAGNOSIS — E03.9 HYPOTHYROIDISM, UNSPECIFIED TYPE: ICD-10-CM

## 2019-08-30 DIAGNOSIS — E78.2 MIXED HYPERLIPIDEMIA: ICD-10-CM

## 2019-08-30 DIAGNOSIS — E78.5 HYPERLIPIDEMIA, UNSPECIFIED HYPERLIPIDEMIA TYPE: ICD-10-CM

## 2019-08-30 DIAGNOSIS — E11.9 CONTROLLED TYPE 2 DIABETES MELLITUS WITHOUT COMPLICATION, WITHOUT LONG-TERM CURRENT USE OF INSULIN (HCC): Primary | ICD-10-CM

## 2019-08-30 DIAGNOSIS — E55.9 VITAMIN D DEFICIENCY: ICD-10-CM

## 2019-08-30 PROCEDURE — 99214 OFFICE O/P EST MOD 30 MIN: CPT | Performed by: NURSE PRACTITIONER

## 2019-08-30 RX ORDER — LISINOPRIL 40 MG/1
40 TABLET ORAL DAILY
Qty: 30 TABLET | Refills: 5 | Status: SHIPPED | OUTPATIENT
Start: 2019-08-30 | End: 2020-04-29

## 2019-08-30 RX ORDER — ROSUVASTATIN CALCIUM 20 MG/1
40 TABLET, COATED ORAL NIGHTLY
Qty: 30 TABLET | Refills: 5 | Status: SHIPPED | OUTPATIENT
Start: 2019-08-30 | End: 2020-06-11 | Stop reason: SDUPTHER

## 2019-08-30 RX ORDER — CARVEDILOL 25 MG/1
25 TABLET ORAL DAILY
Qty: 30 TABLET | Refills: 5 | Status: SHIPPED | OUTPATIENT
Start: 2019-08-30 | End: 2020-04-04

## 2019-08-30 RX ORDER — CLONIDINE HYDROCHLORIDE 0.1 MG/1
0.1 TABLET ORAL 2 TIMES DAILY
Qty: 60 TABLET | Refills: 5 | Status: SHIPPED | OUTPATIENT
Start: 2019-08-30 | End: 2020-05-15

## 2019-08-30 RX ORDER — EMPAGLIFLOZIN 25 MG/1
25 TABLET, FILM COATED ORAL DAILY
Qty: 30 TABLET | Refills: 5 | Status: SHIPPED | OUTPATIENT
Start: 2019-08-30 | End: 2020-05-07

## 2019-08-30 RX ORDER — LEVOTHYROXINE SODIUM 175 UG/1
175 TABLET ORAL DAILY
Qty: 30 TABLET | Refills: 5 | Status: SHIPPED | OUTPATIENT
Start: 2019-08-30 | End: 2020-07-07

## 2019-08-30 RX ORDER — ERGOCALCIFEROL 1.25 MG/1
CAPSULE ORAL
Qty: 8 CAPSULE | Refills: 5 | Status: SHIPPED | OUTPATIENT
Start: 2019-08-30 | End: 2020-03-05

## 2019-08-30 RX ORDER — TESTOSTERONE CYPIONATE 200 MG/ML
INJECTION, SOLUTION INTRAMUSCULAR
Qty: 4 ML | Refills: 0 | Status: SHIPPED | OUTPATIENT
Start: 2019-08-30 | End: 2019-10-11 | Stop reason: SDUPTHER

## 2019-08-30 NOTE — PROGRESS NOTES
"Subjective   Adarsh Davison is a 54 y.o. male is here today for follow-up.  Chief Complaint   Patient presents with   • Diabetes     recent labs, testing BG irregularly, pt did not bring meter   • Hyperlipidemia   • Hypertension   • Hypogonadism   • Vitamin D Deficiency     /78   Ht 182.9 cm (72\")   Wt (!) 153 kg (338 lb)   BMI 45.84 kg/m²   Current Outpatient Medications on File Prior to Visit   Medication Sig   • carvedilol (COREG) 25 MG tablet TAKE ONE TABLET BY MOUTH DAILY   • CloNIDine (CATAPRES) 0.1 MG tablet TAKE ONE TABLET BY MOUTH TWICE A DAY   • Dulaglutide (TRULICITY) 0.75 MG/0.5ML solution pen-injector Inject 0.75 mg under the skin into the appropriate area as directed 1 (One) Time Per Week.   • ergocalciferol (DRISDOL) 94983 units capsule Take 1 po 2 times weekly   • furosemide (LASIX) 40 MG tablet Take 40 mg by mouth daily.   • JARDIANCE 10 MG tablet Take 1 tablet by mouth Daily.   • levothyroxine (SYNTHROID, LEVOTHROID) 175 MCG tablet Take 1 tablet by mouth Daily.   • lisinopril (PRINIVIL,ZESTRIL) 40 MG tablet Take 1 tablet by mouth Daily.   • rosuvastatin (CRESTOR) 20 MG tablet Take 2 tablets by mouth Every Night.   • Syringe/Needle, Disp, (B-D 3CC LUER-BETTY SYR 23GX1\") 23G X 1\" 3 ML misc USE AS DIRECTED FOR TESTOSTERONE ADMINISTRATION   • Testosterone Cypionate (DEPOTESTOTERONE CYPIONATE) 200 MG/ML injection Take 1 cc IM by injection every 10 days   • [DISCONTINUED] carvedilol (COREG) 25 MG tablet TAKE ONE TABLET BY MOUTH DAILY   • [DISCONTINUED] rosuvastatin (CRESTOR) 20 MG tablet TAKE ONE TABLET BY MOUTH ONCE NIGHTLY     No current facility-administered medications on file prior to visit.      History reviewed. No pertinent family history.  Social History     Tobacco Use   • Smoking status: Never Smoker   • Smokeless tobacco: Never Used   Substance Use Topics   • Alcohol use: No   • Drug use: No     Allergies   Allergen Reactions   • Cialis [Tadalafil] Other (See Comments)     BODY ACHES "   • Metformin Confusion   • Lortab [Hydrocodone-Acetaminophen] Rash   • Lotrel [Amlodipine Besy-Benazepril Hcl] Rash         History of Present Illness   Encounter Diagnoses   Name Primary?   • Controlled type 2 diabetes mellitus without complication, without long-term current use of insulin (CMS/[facility]) Yes   • Essential hypertension    • Low testosterone    • Vitamin D deficiency    • Mixed hyperlipidemia    • Primary hypothyroidism    54-year-old male patient here today for routine follow-up visit.  He is being seen for the above diagnoses.  He had recent labs done his primary care provider's office and he brought a copy of his labs to today's visit.  His thyroid hormones are in satisfactory range.  He is taking his medications as prescribed.  He explains that he had his testosterone level checked within a few days of taking a testosterone injection.  His testosterone level was above normal on his most recent labs.  Juice was reviewed at today's visit.  He is requesting refills on his medications.  He did not bring his blood glucose meter has not checking his blood sugars regularly.  He denies any signs and symptoms of hypothyroidism.  He has been successful with some weight loss according to our scales.  He has not had any hypoglycemic events.  He has had no recent which to go to the emergency room      The following portions of the patient's history were reviewed and updated as appropriate: allergies, current medications, past family history, past medical history, past social history, past surgical history and problem list.    Review of Systems   Constitutional: Negative.    Eyes: Negative.    Cardiovascular: Negative.    Endocrine: Negative.    Skin: Negative.    Neurological: Positive for numbness.        Neuropathy in hands and feet       Objective   Physical Exam   Constitutional: He is oriented to person, place, and time. He appears well-developed and well-nourished. No distress.   HENT:   Head:  Normocephalic and atraumatic.   Right Ear: External ear normal.   Left Ear: External ear normal.   Nose: Nose normal.   Eyes: Pupils are equal, round, and reactive to light. Right eye exhibits no discharge. Left eye exhibits no discharge.   Neck: Normal range of motion. Neck supple. Carotid bruit is not present. No tracheal deviation, no edema and no erythema present. Thyromegaly present.   Cardiovascular: Normal rate, regular rhythm, normal heart sounds and intact distal pulses. Exam reveals no gallop and no friction rub.   No murmur heard.  Pulmonary/Chest: Effort normal and breath sounds normal. No respiratory distress. He has no wheezes. He has no rales.   Abdominal: Soft. Bowel sounds are normal. He exhibits no distension. There is no tenderness.   Musculoskeletal: Normal range of motion. He exhibits edema. He exhibits no deformity.   Lymphadenopathy:     He has no cervical adenopathy.   Neurological: He is alert and oriented to person, place, and time. Coordination normal.   Skin: Skin is warm and dry. No rash noted. He is not diaphoretic. No erythema. No pallor.   Psychiatric: He has a normal mood and affect. His behavior is normal. Judgment and thought content normal.   Nursing note and vitals reviewed.      Results for orders placed or performed in visit on 01/18/19   Hemoglobin & Hematocrit, Blood   Result Value Ref Range    Hemoglobin 14.6 13.7 - 17.6 g/dL    Hematocrit 45.6 40.4 - 52.2 %   PSA DIAGNOSTIC   Result Value Ref Range    PSA 0.309 0.000 - 4.000 ng/mL   C-Peptide   Result Value Ref Range    C-Peptide 12.1 (H) 1.1 - 4.4 ng/mL   Vitamin D 25 Hydroxy   Result Value Ref Range    25 Hydroxy, Vitamin D 26.9 (L) 30.0 - 100.0 ng/ml   Hemoglobin A1c   Result Value Ref Range    Hemoglobin A1C 7.86 (H) 4.80 - 5.60 %   Comprehensive Metabolic Panel   Result Value Ref Range    Glucose 152 (H) 65 - 99 mg/dL    BUN 16 6 - 20 mg/dL    Creatinine 0.76 0.76 - 1.27 mg/dL    eGFR Non African Am 107 >60 mL/min/1.73     eGFR African Am 130 >60 mL/min/1.73    BUN/Creatinine Ratio 21.1 7.0 - 25.0    Sodium 140 136 - 145 mmol/L    Potassium 4.6 3.5 - 5.2 mmol/L    Chloride 103 98 - 107 mmol/L    Total CO2 27.3 22.0 - 29.0 mmol/L    Calcium 9.3 8.6 - 10.5 mg/dL    Total Protein 6.5 6.0 - 8.5 g/dL    Albumin 3.90 3.50 - 5.20 g/dL    Globulin 2.6 gm/dL    A/G Ratio 1.5 g/dL    Total Bilirubin 0.3 0.1 - 1.2 mg/dL    Alkaline Phosphatase 52 39 - 117 U/L    AST (SGOT) 20 1 - 40 U/L    ALT (SGPT) 20 1 - 41 U/L   Lipid Panel   Result Value Ref Range    Total Cholesterol 161 0 - 200 mg/dL    Triglycerides 304 (H) 0 - 150 mg/dL    HDL Cholesterol 34 (L) 40 - 60 mg/dL    VLDL Cholesterol 60.8 (H) 5 - 40 mg/dL    LDL Cholesterol  66 0 - 100 mg/dL   TestT+TestF+SHBG   Result Value Ref Range    Testosterone, Total 57 (L) 264 - 916 ng/dL    Testosterone, Free 2.5 (L) 7.2 - 24.0 pg/mL    Sex Hormone Binding Globulin 30.7 19.3 - 76.4 nmol/L   Cardiovascular Risk Assessment   Result Value Ref Range    Interpretation Note    Diabetes Patient Education   Result Value Ref Range    PDF Image Not applicable        Assessment/Plan   Problems Addressed this Visit        Cardiovascular and Mediastinum    HTN (hypertension)    Hyperlipidemia       Digestive    Vitamin D deficiency       Endocrine    Controlled type 2 diabetes mellitus without complication, without long-term current use of insulin (CMS/[facility]) - Primary    Primary hypothyroidism       Other    Low testosterone        Patient was seen and examined.  Clinically metabolically he is stable.  His A1c is at 7.  We will increase his Trulicity to 1.5 mg weekly and increase his Jardiance to 25 mg weekly.  He will continue all his other medications as prescribed.  He will follow-up in this office in 6 months with labs prior externally.  Orders have been placed.  He is been encouraged to contact the office should he have any questions or concerns prior to his next visit.

## 2019-08-30 NOTE — PATIENT INSTRUCTIONS
Increase trulicity to 1.5 mg weekly  Increase jardiance 25 mg daily  Continue all other meds the same   Labs prior to next visit

## 2019-09-08 NOTE — TELEPHONE ENCOUNTER
----- Message from JUAN Mcgarry sent at 8/8/2018 10:30 AM EDT -----  Contact: PATIENT  fmla papers can be filled out (there is a cost) to allow time off work to attend appts routinely without it counting against him. He just needs to bring them in to be done.   ----- Message -----  From: Kadi Mcknight MA  Sent: 8/8/2018  10:21 AM  To: JUAN Mcgarry        ----- Message -----  From: Molly Zelaya RegSched Rep  Sent: 8/8/2018  10:05 AM  To: Kadi Mcknight MA    PATIENT STATED HE WAS JUST IN AND WAS DIAGNOSE WITH DIABETES. PATIENT IS WANTING TO KNOW IF HE CAN GET FMLA FOR DIABETES AND IF SO, WHAT DOES PATIENT NEED TO DO TO FILE FOR IT. PATIENT WOULD LIKE A CALL BACK.    [facility] [phone]        INFORMED PATIENT ABOUT FMLA PAPERS. PATIENT EXPRESSED UNDERSTANDING.    .

## 2019-09-09 ENCOUNTER — PRIOR AUTHORIZATION (OUTPATIENT)
Dept: ENDOCRINOLOGY | Age: 54
End: 2019-09-09

## 2019-09-10 RX ORDER — EMPAGLIFLOZIN 10 MG/1
TABLET, FILM COATED ORAL
Qty: 30 TABLET | Refills: 4 | Status: SHIPPED | OUTPATIENT
Start: 2019-09-10 | End: 2019-09-16

## 2019-09-16 ENCOUNTER — HOSPITAL ENCOUNTER (EMERGENCY)
Facility: HOSPITAL | Age: 54
Discharge: HOME | End: 2019-09-16
Attending: EMERGENCY MEDICINE | Admitting: EMERGENCY MEDICINE

## 2019-09-16 ENCOUNTER — APPOINTMENT (OUTPATIENT)
Dept: CT IMAGING | Facility: HOSPITAL | Age: 54
End: 2019-09-16

## 2019-09-16 VITALS
SYSTOLIC BLOOD PRESSURE: 140 MMHG | WEIGHT: 315 LBS | TEMPERATURE: 97.4 F | RESPIRATION RATE: 16 BRPM | DIASTOLIC BLOOD PRESSURE: 104 MMHG | HEART RATE: 72 BPM | OXYGEN SATURATION: 93 % | HEIGHT: 72 IN | BODY MASS INDEX: 42.66 KG/M2

## 2019-09-16 DIAGNOSIS — R10.31 RIGHT LOWER QUADRANT ABDOMINAL PAIN: ICD-10-CM

## 2019-09-16 DIAGNOSIS — I10 ESSENTIAL HYPERTENSION: ICD-10-CM

## 2019-09-16 DIAGNOSIS — S39.012A LUMBAR STRAIN, INITIAL ENCOUNTER: Primary | ICD-10-CM

## 2019-09-16 LAB
ALBUMIN SERPL-MCNC: 4 G/DL (ref 3.5–5.2)
ALBUMIN/GLOB SERPL: 1.3 G/DL
ALP SERPL-CCNC: 54 U/L (ref 39–117)
ALT SERPL W P-5'-P-CCNC: 17 U/L (ref 1–41)
ANION GAP SERPL CALCULATED.3IONS-SCNC: 11.2 MMOL/L (ref 5–15)
AST SERPL-CCNC: 20 U/L (ref 1–40)
BACTERIA UR QL AUTO: NORMAL /HPF
BASOPHILS # BLD AUTO: 0.04 10*3/MM3 (ref 0–0.2)
BASOPHILS NFR BLD AUTO: 0.4 % (ref 0–1.5)
BILIRUB SERPL-MCNC: 0.3 MG/DL (ref 0.2–1.2)
BILIRUB UR QL STRIP: NEGATIVE
BUN BLD-MCNC: 10 MG/DL (ref 6–20)
BUN/CREAT SERPL: 10.1 (ref 7–25)
CALCIUM SPEC-SCNC: 9.2 MG/DL (ref 8.6–10.5)
CHLORIDE SERPL-SCNC: 101 MMOL/L (ref 98–107)
CLARITY UR: CLEAR
CO2 SERPL-SCNC: 24.8 MMOL/L (ref 22–29)
COLOR UR: YELLOW
CREAT BLD-MCNC: 0.99 MG/DL (ref 0.76–1.27)
DEPRECATED RDW RBC AUTO: 57.9 FL (ref 37–54)
EOSINOPHIL # BLD AUTO: 0.42 10*3/MM3 (ref 0–0.4)
EOSINOPHIL NFR BLD AUTO: 4.7 % (ref 0.3–6.2)
ERYTHROCYTE [DISTWIDTH] IN BLOOD BY AUTOMATED COUNT: 19.4 % (ref 12.3–15.4)
GFR SERPL CREATININE-BSD FRML MDRD: 79 ML/MIN/1.73
GLOBULIN UR ELPH-MCNC: 3.1 GM/DL
GLUCOSE BLD-MCNC: 187 MG/DL (ref 65–99)
GLUCOSE UR STRIP-MCNC: ABNORMAL MG/DL
HCT VFR BLD AUTO: 55.5 % (ref 37.5–51)
HGB BLD-MCNC: 17.8 G/DL (ref 13–17.7)
HGB UR QL STRIP.AUTO: ABNORMAL
HYALINE CASTS UR QL AUTO: NORMAL /LPF
IMM GRANULOCYTES # BLD AUTO: 0.02 10*3/MM3 (ref 0–0.05)
IMM GRANULOCYTES NFR BLD AUTO: 0.2 % (ref 0–0.5)
KETONES UR QL STRIP: NEGATIVE
LEUKOCYTE ESTERASE UR QL STRIP.AUTO: NEGATIVE
LIPASE SERPL-CCNC: 40 U/L (ref 13–60)
LYMPHOCYTES # BLD AUTO: 2.27 10*3/MM3 (ref 0.7–3.1)
LYMPHOCYTES NFR BLD AUTO: 25.3 % (ref 19.6–45.3)
MCH RBC QN AUTO: 27.9 PG (ref 26.6–33)
MCHC RBC AUTO-ENTMCNC: 32.1 G/DL (ref 31.5–35.7)
MCV RBC AUTO: 87 FL (ref 79–97)
MONOCYTES # BLD AUTO: 0.77 10*3/MM3 (ref 0.1–0.9)
MONOCYTES NFR BLD AUTO: 8.6 % (ref 5–12)
NEUTROPHILS # BLD AUTO: 5.46 10*3/MM3 (ref 1.7–7)
NEUTROPHILS NFR BLD AUTO: 60.8 % (ref 42.7–76)
NITRITE UR QL STRIP: NEGATIVE
NRBC BLD AUTO-RTO: 0 /100 WBC (ref 0–0.2)
PH UR STRIP.AUTO: <=5 [PH] (ref 5–8)
PLATELET # BLD AUTO: 259 10*3/MM3 (ref 140–450)
PMV BLD AUTO: 11.4 FL (ref 6–12)
POTASSIUM BLD-SCNC: 4.1 MMOL/L (ref 3.5–5.2)
PROT SERPL-MCNC: 7.1 G/DL (ref 6–8.5)
PROT UR QL STRIP: NEGATIVE
RBC # BLD AUTO: 6.38 10*6/MM3 (ref 4.14–5.8)
RBC # UR: NORMAL /HPF
REF LAB TEST METHOD: NORMAL
SODIUM BLD-SCNC: 137 MMOL/L (ref 136–145)
SP GR UR STRIP: 1.02 (ref 1–1.03)
SQUAMOUS #/AREA URNS HPF: NORMAL /HPF
UROBILINOGEN UR QL STRIP: ABNORMAL
WBC NRBC COR # BLD: 8.98 10*3/MM3 (ref 3.4–10.8)
WBC UR QL AUTO: NORMAL /HPF

## 2019-09-16 PROCEDURE — 83690 ASSAY OF LIPASE: CPT | Performed by: EMERGENCY MEDICINE

## 2019-09-16 PROCEDURE — 25010000002 HYDROMORPHONE 1 MG/ML SOLUTION: Performed by: EMERGENCY MEDICINE

## 2019-09-16 PROCEDURE — 80053 COMPREHEN METABOLIC PANEL: CPT | Performed by: EMERGENCY MEDICINE

## 2019-09-16 PROCEDURE — 96375 TX/PRO/DX INJ NEW DRUG ADDON: CPT

## 2019-09-16 PROCEDURE — 96374 THER/PROPH/DIAG INJ IV PUSH: CPT

## 2019-09-16 PROCEDURE — 81001 URINALYSIS AUTO W/SCOPE: CPT | Performed by: EMERGENCY MEDICINE

## 2019-09-16 PROCEDURE — 99283 EMERGENCY DEPT VISIT LOW MDM: CPT

## 2019-09-16 PROCEDURE — 25010000002 IOPAMIDOL 61 % SOLUTION: Performed by: EMERGENCY MEDICINE

## 2019-09-16 PROCEDURE — 85025 COMPLETE CBC W/AUTO DIFF WBC: CPT | Performed by: EMERGENCY MEDICINE

## 2019-09-16 PROCEDURE — 74177 CT ABD & PELVIS W/CONTRAST: CPT

## 2019-09-16 PROCEDURE — 25010000002 ONDANSETRON PER 1 MG: Performed by: EMERGENCY MEDICINE

## 2019-09-16 RX ORDER — ONDANSETRON 2 MG/ML
4 INJECTION INTRAMUSCULAR; INTRAVENOUS ONCE
Status: COMPLETED | OUTPATIENT
Start: 2019-09-16 | End: 2019-09-16

## 2019-09-16 RX ORDER — IBUPROFEN 600 MG/1
600 TABLET ORAL EVERY 6 HOURS PRN
Qty: 24 TABLET | Refills: 0 | Status: SHIPPED | OUTPATIENT
Start: 2019-09-16 | End: 2021-09-24

## 2019-09-16 RX ORDER — CYCLOBENZAPRINE HCL 10 MG
10 TABLET ORAL 3 TIMES DAILY PRN
Qty: 12 TABLET | Refills: 0 | Status: SHIPPED | OUTPATIENT
Start: 2019-09-16 | End: 2020-12-11

## 2019-09-16 RX ORDER — SODIUM CHLORIDE 0.9 % (FLUSH) 0.9 %
10 SYRINGE (ML) INJECTION AS NEEDED
Status: DISCONTINUED | OUTPATIENT
Start: 2019-09-16 | End: 2019-09-16 | Stop reason: HOSPADM

## 2019-09-16 RX ADMIN — ONDANSETRON 4 MG: 2 INJECTION INTRAMUSCULAR; INTRAVENOUS at 08:31

## 2019-09-16 RX ADMIN — IOPAMIDOL 100 ML: 612 INJECTION, SOLUTION INTRAVENOUS at 09:02

## 2019-09-16 RX ADMIN — SODIUM CHLORIDE, POTASSIUM CHLORIDE, SODIUM LACTATE AND CALCIUM CHLORIDE 1000 ML: 600; 310; 30; 20 INJECTION, SOLUTION INTRAVENOUS at 08:30

## 2019-09-16 RX ADMIN — HYDROMORPHONE HYDROCHLORIDE 1 MG: 1 INJECTION, SOLUTION INTRAMUSCULAR; INTRAVENOUS; SUBCUTANEOUS at 08:31

## 2019-09-16 NOTE — ED PROVIDER NOTES
" EMERGENCY DEPARTMENT ENCOUNTER    CHIEF COMPLAINT  Chief Complaint: Back pain  History given by: Pt  History limited by: none  Room Number: 01/01  PMD: Harrison Chin MD      HPI:  Pt is a 54 y.o. male who presents complaining of intermittent right mid to lower back pain, described as a \"clenching\" sensation, that began a few weeks ago. He states the pain has now began to radiate into his flank and down into his RLQ of his abdomen. It will last 30 minutes to an hour at a time. The pain is worse with movement. He denies any changes with eating or drinking. He reports nausea but denies any fever, chills, vomiting,  sx, black or bloody stools, constipation, diarrhea, or new skin rash. He states he hasn't taken anything for the pain. Hx of DM and HTN. He has been compliant with all his medications. He denies Hx of abd surgeries. FHx of kidney stones.     Duration:  30 minutes to 1 hour  Onset: gradual  Timing: intermittetn  Location: right mid to lower back  Radiation: right flank into RLQ  Quality: \"clenching\"  Intensity/Severity: moderate  Progression: progressively worsening.   Associated Symptoms: nausea  Aggravating Factors: movement  Alleviating Factors: rest  Previous Episodes: none  Treatment before arrival: none    PAST MEDICAL HISTORY  Active Ambulatory Problems     Diagnosis Date Noted   • Dyslipidemia with high LDL and low HDL 04/06/2015   • HTN (hypertension) 09/09/2012   • Hyperlipidemia 07/13/2018   • Low testosterone 07/07/2014   • Toenail fungus 07/13/2018   • Fissure of skin 07/13/2018   • Abnormal weight gain 07/13/2018   • Controlled type 2 diabetes mellitus without complication, without long-term current use of insulin (CMS/[facility]) 07/19/2018   • Multiple lung nodules on CT 09/07/2018   • Vitamin D deficiency 10/29/2018   • Primary hypothyroidism 08/30/2019     Resolved Ambulatory Problems     Diagnosis Date Noted   • No Resolved Ambulatory Problems     Past Medical History:   Diagnosis Date   • " Arthritis    • Carpal tunnel syndrome of right wrist    • Disease of thyroid gland    • Edema    • Hypertension    • Labral tear of shoulder    • Type 2 diabetes mellitus (CMS/HCC)    • Vitamin D deficiency        PAST SURGICAL HISTORY  Past Surgical History:   Procedure Laterality Date   • SHOULDER ARTHROSCOPY W/ ROTATOR CUFF REPAIR Left 10/19/2016    Procedure: LEFT SHOULDER ARTHROSCOPY WITH ROTATOR CUFF REPAIR WITH ACROMIOPLASTY, DISTAL CLAVICLE EXCISION, LABRAL DEBRIDEMENT;  Surgeon: David Allison MD;  Location: [facility] OR [facility];  Service:    • TONSILLECTOMY     • UVULECTOMY         FAMILY HISTORY  History reviewed. No pertinent family history.    SOCIAL HISTORY  Social History     Socioeconomic History   • Marital status:      Spouse name: Not on file   • Number of children: Not on file   • Years of education: Not on file   • Highest education level: Not on file   Tobacco Use   • Smoking status: Never Smoker   • Smokeless tobacco: Never Used   Substance and Sexual Activity   • Alcohol use: No   • Drug use: No       ALLERGIES  Cialis [tadalafil]; Metformin; Lortab [hydrocodone-acetaminophen]; and Lotrel [amlodipine besy-benazepril hcl]    REVIEW OF SYSTEMS  Review of Systems   Constitutional: Negative.  Negative for fever.   HENT: Negative.  Negative for sore throat.    Eyes: Negative.    Respiratory: Negative.  Negative for cough.    Cardiovascular: Negative.  Negative for chest pain.   Gastrointestinal: Positive for nausea. Negative for abdominal pain, blood in stool, constipation, diarrhea and vomiting.   Genitourinary: Negative.  Negative for dysuria.   Musculoskeletal: Positive for back pain (right mid to lower for 3 weeks, intermittent, radiates into RLQ).   Skin: Negative.  Negative for rash.   Neurological: Negative.  Negative for headaches.   All other systems reviewed and are negative.      PHYSICAL EXAM  ED Triage Vitals   Temp Heart Rate Resp BP SpO2   09/16/19 0749 09/16/19 0749 09/16/19 0749  09/16/19 0801 09/16/19 0749   97.4 °F (36.3 °C) 108 16 (!) 179/115 95 %      Temp src Heart Rate Source Patient Position BP Location FiO2 (%)   -- -- -- -- --              Physical Exam   Constitutional: He is oriented to person, place, and time.  Non-toxic appearance. No distress ( mildly).   HENT:   Head: Normocephalic and atraumatic.   Mouth/Throat: Oropharynx is clear and moist and mucous membranes are normal.   Oropharynx is within normal limits   Eyes: EOM are normal. Pupils are equal, round, and reactive to light.   Neck: Normal range of motion. Neck supple.   Cardiovascular: Normal rate, regular rhythm and normal heart sounds.   No murmur heard.  Pulses:       Posterior tibial pulses are 2+ on the right side, and 2+ on the left side.   Pulmonary/Chest: Effort normal and breath sounds normal. No respiratory distress. He has no wheezes. He has no rales.   Abdominal: Soft. Bowel sounds are normal. There is tenderness (mild RLQ). There is no rebound and no guarding.   Musculoskeletal: Normal range of motion. He exhibits no edema.   Right lumbar paravertebral muscle TTP. Tenderness to palpation to right lateral torso stabilizers.    Neurological: He is alert and oriented to person, place, and time. He has normal strength.   Skin: Skin is warm, dry and intact. No rash noted.   Psychiatric: Mood, affect and judgment normal.   Nursing note and vitals reviewed.      LAB RESULTS  Lab Results (last 24 hours)     Procedure Component Value Units Date/Time    CBC & Differential [[id number]] Collected:  09/16/19 0801    Specimen:  Blood Updated:  09/16/19 0825    Narrative:       The following orders were created for panel order CBC & Differential.  Procedure                               Abnormality         Status                     ---------                               -----------         ------                     CBC Auto Differential[[id number]]        Abnormal            Final result                 Please view  results for these tests on the individual orders.    Comprehensive Metabolic Panel [[id number]]  (Abnormal) Collected:  09/16/19 0801    Specimen:  Blood Updated:  09/16/19 0837     Glucose 187 mg/dL      BUN 10 mg/dL      Creatinine 0.99 mg/dL      Sodium 137 mmol/L      Potassium 4.1 mmol/L      Chloride 101 mmol/L      CO2 24.8 mmol/L      Calcium 9.2 mg/dL      Total Protein 7.1 g/dL      Albumin 4.00 g/dL      ALT (SGPT) 17 U/L      AST (SGOT) 20 U/L      Alkaline Phosphatase 54 U/L      Total Bilirubin 0.3 mg/dL      eGFR Non African Amer 79 mL/min/1.73      Globulin 3.1 gm/dL      A/G Ratio 1.3 g/dL      BUN/Creatinine Ratio 10.1     Anion Gap 11.2 mmol/L     Narrative:       GFR Normal >60  Chronic Kidney Disease <60  Kidney Failure <15    Lipase [[id number]]  (Normal) Collected:  09/16/19 0801    Specimen:  Blood Updated:  09/16/19 0837     Lipase 40 U/L     Urinalysis With Microscopic If Indicated (No Culture) - Urine, Clean Catch [[id number]]  (Abnormal) Collected:  09/16/19 0801    Specimen:  Urine, Clean Catch Updated:  09/16/19 0819     Color, UA Yellow     Appearance, UA Clear     pH, UA <=5.0     Specific Gravity, UA 1.024     Glucose, UA >=1000 mg/dL (3+)     Ketones, UA Negative     Bilirubin, UA Negative     Blood, UA Small (1+)     Protein, UA Negative     Leuk Esterase, UA Negative     Nitrite, UA Negative     Urobilinogen, UA 0.2 E.U./dL    CBC Auto Differential [[id number]]  (Abnormal) Collected:  09/16/19 0801    Specimen:  Blood Updated:  09/16/19 0825     WBC 8.98 10*3/mm3      RBC 6.38 10*6/mm3      Hemoglobin 17.8 g/dL      Hematocrit 55.5 %      MCV 87.0 fL      MCH 27.9 pg      MCHC 32.1 g/dL      RDW 19.4 %      RDW-SD 57.9 fl      MPV 11.4 fL      Platelets 259 10*3/mm3      Neutrophil % 60.8 %      Lymphocyte % 25.3 %      Monocyte % 8.6 %      Eosinophil % 4.7 %      Basophil % 0.4 %      Immature Grans % 0.2 %      Neutrophils, Absolute 5.46 10*3/mm3      Lymphocytes, Absolute 2.27  10*3/mm3      Monocytes, Absolute 0.77 10*3/mm3      Eosinophils, Absolute 0.42 10*3/mm3      Basophils, Absolute 0.04 10*3/mm3      Immature Grans, Absolute 0.02 10*3/mm3      nRBC 0.0 /100 WBC     Urinalysis, Microscopic Only - Urine, Clean Catch [[id number]] Collected:  09/16/19 0801    Specimen:  Urine, Clean Catch Updated:  09/16/19 0819     RBC, UA 0-2 /HPF      WBC, UA 0-2 /HPF      Bacteria, UA None Seen /HPF      Squamous Epithelial Cells, UA 0-2 /HPF      Hyaline Casts, UA None Seen /LPF      Methodology Automated Microscopy          I ordered the above labs and reviewed the results    RADIOLOGY  CT Abdomen Pelvis With Contrast   Preliminary Result   1. No acute abnormality within the abdomen and pelvis.   2. Mild gastric wall thickening that may suggest gastritis.   3. Colonic diverticula.       These findings were discussed with Dr. Mondragon by telephone.       Radiation dose reduction techniques were utilized, including automated   exposure control and exposure modulation based on body size.                   I ordered the above noted radiological studies. Interpreted by radiologist. Discussed with radiologist (). Reviewed by me in PACS.       PROCEDURES  Procedures      PROGRESS AND CONSULTS       0832  Ordered lab work and CT a/p for further evaluation.     1011  Per Dr. Ramirez, CT a/p is negative acute. no urinary obstruction. no surgical problems.     1120  Rechecked pt. Pt is resting comfortably. Notified pt of test results, including negative acute CT a/p findings. Informed pt concern for lumbar strain. Discussed the plan to discharge the pt home with prescriptions for NSAIDs and muscle relaxants. I instructed the pt to f/u with his PCP for a recheck in his BP. Pt understands and agrees with the plan, all questions answered.          MEDICAL DECISION MAKING  Results were reviewed/discussed with the patient and they were also made aware of online access. Pt also made aware that some labs, such as  cultures, will not be resulted during ER visit and follow up with PMD is necessary.     MDM  Number of Diagnoses or Management Options  Essential hypertension:   Lumbar strain, initial encounter:      Amount and/or Complexity of Data Reviewed  Clinical lab tests: reviewed and ordered (UA negative; CBC: WBC = 8.98)  Tests in the radiology section of CPT®: reviewed and ordered (CT is negative acute. no urinary obstruction. no surgical problems. )  Decide to obtain previous medical records or to obtain history from someone other than the patient: yes  Review and summarize past medical records: yes (The pt has a Hx of HTN and DM.)           DIAGNOSIS  Final diagnoses:   Lumbar strain, initial encounter   Essential hypertension   Right lower quadrant abdominal pain       DISPOSITION  DISCHARGE    Patient discharged in stable condition.    Reviewed implications of results, diagnosis, meds, responsibility to follow up, warning signs and symptoms of possible worsening, potential complications and reasons to return to ER.    Patient/Family voiced understanding of above instructions.    Discussed plan for discharge, as there is no emergent indication for admission. Patient referred to primary care provider for BP management due to today's BP. Pt/family is agreeable and understands need for follow up and repeat testing.  Pt is aware that discharge does not mean that nothing is wrong but it indicates no emergency is present that requires admission and they must continue care with follow-up as given below or physician of their choice.     FOLLOW-UP  Harrison Chin MD  [address] [place] [address] DR HAM [address] [place] [id number] [phone]    Schedule an appointment as soon as possible for a visit in 3 days  EVEN IF WELL         Medication List      New Prescriptions    cyclobenzaprine 10 MG tablet  Commonly known as:  FLEXERIL  Take 1 tablet by mouth 3 (Three) Times a Day As Needed for Muscle Spasms.     ibuprofen 600 MG  tablet  Commonly known as:  ADVIL,MOTRIN  Take 1 tablet by mouth Every 6 (Six) Hours As Needed for Mild Pain  or   Moderate Pain  (take with food).              Latest Documented Vital Signs:  As of 11:38 AM  BP- (!) 140/104 HR- 72 Temp- 97.4 °F (36.3 °C) O2 sat- 93%    --  Documentation assistance provided by laina Sy for Dr. Mondragon.  Information recorded by the scribe was done at my direction and has been verified and validated by me.                 Maxwell Sy  09/16/19 1259       Anamaria Mondragon MD  09/18/19 [id number]

## 2019-09-16 NOTE — DISCHARGE INSTRUCTIONS
You are advised to follow closely with Dr. Chin in 2-3 days for recheck, blood pressure recheck, final results of lab work and imaging testing, and further testing/treatment as needed.    Drink plenty of fluids-at least 10 glasses of water daily.  Heat and gentle stretching to your back.  No repeated bending/heavy lifting until cleared by Dr. Chin    Please return to the emergency department immediately with chest pain different than usual for you, shortness of air, persistent or worsening abdominal pain, persistent vomiting/fever, blood in emesis or stool, lightheadedness/fainting, problems with speech, new weakness/numbness of extremities, new incontinence, problems with vision,  or for worsening of symptoms or other concerns.

## 2019-10-11 RX ORDER — TESTOSTERONE CYPIONATE 200 MG/ML
INJECTION, SOLUTION INTRAMUSCULAR
Qty: 4 ML | Refills: 0 | Status: SHIPPED | OUTPATIENT
Start: 2019-10-11 | End: 2019-12-20 | Stop reason: SDUPTHER

## 2019-12-18 RX ORDER — ROSUVASTATIN CALCIUM 20 MG/1
TABLET, COATED ORAL
Qty: 30 TABLET | Refills: 2 | Status: SHIPPED | OUTPATIENT
Start: 2019-12-18 | End: 2020-06-11 | Stop reason: SDUPTHER

## 2019-12-20 RX ORDER — TESTOSTERONE CYPIONATE 200 MG/ML
INJECTION, SOLUTION INTRAMUSCULAR
Qty: 4 ML | Refills: 0 | Status: SHIPPED | OUTPATIENT
Start: 2019-12-20 | End: 2020-05-01 | Stop reason: SDUPTHER

## 2019-12-23 RX ORDER — NEEDLES, DISPOSABLE 25GX5/8"
NEEDLE, DISPOSABLE MISCELLANEOUS
Qty: 3 EACH | Refills: 4 | Status: SHIPPED | OUTPATIENT
Start: 2019-12-23

## 2019-12-27 RX ORDER — ROSUVASTATIN CALCIUM 20 MG/1
TABLET, COATED ORAL
Qty: 30 TABLET | Refills: 2 | Status: SHIPPED | OUTPATIENT
Start: 2019-12-27 | End: 2020-06-11 | Stop reason: SDUPTHER

## 2020-02-12 ENCOUNTER — TELEPHONE (OUTPATIENT)
Dept: ENDOCRINOLOGY | Age: 55
End: 2020-02-12

## 2020-02-12 NOTE — TELEPHONE ENCOUNTER
Patient left vm  He is laid off from work  Pt states manpreet told him he could have samples  trulicity and jardiance    Please call patient when ready to be picked up   
Spoke with patient and let him know that I have put jardiance 25mg (1 month supply) at the front for him to . Informed him that we don't have trulicity 1.5mg at this time.   
operating room

## 2020-02-29 ENCOUNTER — RESULTS ENCOUNTER (OUTPATIENT)
Dept: ENDOCRINOLOGY | Age: 55
End: 2020-02-29

## 2020-02-29 DIAGNOSIS — E78.5 HYPERLIPIDEMIA, UNSPECIFIED HYPERLIPIDEMIA TYPE: ICD-10-CM

## 2020-02-29 DIAGNOSIS — E78.2 MIXED HYPERLIPIDEMIA: ICD-10-CM

## 2020-02-29 DIAGNOSIS — E11.9 CONTROLLED TYPE 2 DIABETES MELLITUS WITHOUT COMPLICATION, WITHOUT LONG-TERM CURRENT USE OF INSULIN (HCC): ICD-10-CM

## 2020-02-29 DIAGNOSIS — E03.9 HYPOTHYROIDISM, UNSPECIFIED TYPE: ICD-10-CM

## 2020-02-29 DIAGNOSIS — E03.9 PRIMARY HYPOTHYROIDISM: ICD-10-CM

## 2020-02-29 DIAGNOSIS — I10 ESSENTIAL HYPERTENSION: ICD-10-CM

## 2020-02-29 DIAGNOSIS — E78.5 DYSLIPIDEMIA WITH HIGH LDL AND LOW HDL: ICD-10-CM

## 2020-02-29 DIAGNOSIS — R63.5 ABNORMAL WEIGHT GAIN: ICD-10-CM

## 2020-02-29 DIAGNOSIS — R73.03 PRE-DIABETES: ICD-10-CM

## 2020-02-29 DIAGNOSIS — E55.9 VITAMIN D DEFICIENCY: ICD-10-CM

## 2020-02-29 DIAGNOSIS — R79.89 LOW TESTOSTERONE: ICD-10-CM

## 2020-03-05 RX ORDER — ERGOCALCIFEROL 1.25 MG/1
CAPSULE ORAL
Qty: 8 CAPSULE | Refills: 4 | Status: SHIPPED | OUTPATIENT
Start: 2020-03-05 | End: 2020-12-14 | Stop reason: SDUPTHER

## 2020-04-04 RX ORDER — CARVEDILOL 25 MG/1
TABLET ORAL
Qty: 30 TABLET | Refills: 0 | Status: SHIPPED | OUTPATIENT
Start: 2020-04-04 | End: 2020-05-13

## 2020-04-12 RX ORDER — CARVEDILOL 25 MG/1
TABLET ORAL
Qty: 30 TABLET | Refills: 0 | OUTPATIENT
Start: 2020-04-12

## 2020-04-18 DIAGNOSIS — E78.5 DYSLIPIDEMIA WITH HIGH LDL AND LOW HDL: ICD-10-CM

## 2020-04-18 DIAGNOSIS — I10 ESSENTIAL HYPERTENSION: ICD-10-CM

## 2020-04-18 DIAGNOSIS — R63.5 ABNORMAL WEIGHT GAIN: ICD-10-CM

## 2020-04-18 DIAGNOSIS — R79.89 LOW TESTOSTERONE: ICD-10-CM

## 2020-04-18 DIAGNOSIS — E03.9 HYPOTHYROIDISM, UNSPECIFIED TYPE: ICD-10-CM

## 2020-04-18 DIAGNOSIS — E11.9 CONTROLLED TYPE 2 DIABETES MELLITUS WITHOUT COMPLICATION, WITHOUT LONG-TERM CURRENT USE OF INSULIN (HCC): ICD-10-CM

## 2020-04-18 DIAGNOSIS — E78.5 HYPERLIPIDEMIA, UNSPECIFIED HYPERLIPIDEMIA TYPE: ICD-10-CM

## 2020-04-19 RX ORDER — CLONIDINE HYDROCHLORIDE 0.1 MG/1
TABLET ORAL
Qty: 60 TABLET | Refills: 4 | OUTPATIENT
Start: 2020-04-19

## 2020-04-19 RX ORDER — LISINOPRIL 40 MG/1
TABLET ORAL
Qty: 30 TABLET | Refills: 4 | OUTPATIENT
Start: 2020-04-19

## 2020-04-28 DIAGNOSIS — E03.9 HYPOTHYROIDISM, UNSPECIFIED TYPE: ICD-10-CM

## 2020-04-28 DIAGNOSIS — E11.9 CONTROLLED TYPE 2 DIABETES MELLITUS WITHOUT COMPLICATION, WITHOUT LONG-TERM CURRENT USE OF INSULIN (HCC): ICD-10-CM

## 2020-04-28 DIAGNOSIS — E78.5 HYPERLIPIDEMIA, UNSPECIFIED HYPERLIPIDEMIA TYPE: ICD-10-CM

## 2020-04-28 DIAGNOSIS — I10 ESSENTIAL HYPERTENSION: ICD-10-CM

## 2020-04-28 DIAGNOSIS — E78.5 DYSLIPIDEMIA WITH HIGH LDL AND LOW HDL: ICD-10-CM

## 2020-04-28 DIAGNOSIS — R63.5 ABNORMAL WEIGHT GAIN: ICD-10-CM

## 2020-04-28 DIAGNOSIS — R79.89 LOW TESTOSTERONE: ICD-10-CM

## 2020-04-29 RX ORDER — LISINOPRIL 40 MG/1
TABLET ORAL
Qty: 30 TABLET | Refills: 4 | Status: SHIPPED | OUTPATIENT
Start: 2020-04-29 | End: 2020-09-29

## 2020-05-01 DIAGNOSIS — R79.89 LOW TESTOSTERONE IN MALE: Primary | ICD-10-CM

## 2020-05-01 RX ORDER — TESTOSTERONE CYPIONATE 200 MG/ML
INJECTION, SOLUTION INTRAMUSCULAR
Qty: 4 ML | Refills: 0 | Status: SHIPPED | OUTPATIENT
Start: 2020-05-01 | End: 2020-12-11 | Stop reason: SDUPTHER

## 2020-05-01 NOTE — TELEPHONE ENCOUNTER
Pt called and is requesting a refill for his Testosterone Cypionate be sent to the [facility] on [address]. Pt's insurance does not cover it. He is aware and will pay out of pocket.

## 2020-05-02 RX ORDER — DULAGLUTIDE 1.5 MG/.5ML
INJECTION, SOLUTION SUBCUTANEOUS
Qty: 4 PEN | Refills: 0 | Status: SHIPPED | OUTPATIENT
Start: 2020-05-02 | End: 2020-05-29

## 2020-05-07 RX ORDER — EMPAGLIFLOZIN 25 MG/1
TABLET, FILM COATED ORAL
Qty: 30 TABLET | Refills: 0 | Status: SHIPPED | OUTPATIENT
Start: 2020-05-07 | End: 2020-06-15

## 2020-05-13 RX ORDER — CARVEDILOL 25 MG/1
TABLET ORAL
Qty: 30 TABLET | Refills: 0 | Status: SHIPPED | OUTPATIENT
Start: 2020-05-13 | End: 2020-06-09

## 2020-05-15 DIAGNOSIS — E03.9 HYPOTHYROIDISM, UNSPECIFIED TYPE: ICD-10-CM

## 2020-05-15 DIAGNOSIS — R63.5 ABNORMAL WEIGHT GAIN: ICD-10-CM

## 2020-05-15 DIAGNOSIS — E78.5 DYSLIPIDEMIA WITH HIGH LDL AND LOW HDL: ICD-10-CM

## 2020-05-15 DIAGNOSIS — R79.89 LOW TESTOSTERONE: ICD-10-CM

## 2020-05-15 DIAGNOSIS — E78.5 HYPERLIPIDEMIA, UNSPECIFIED HYPERLIPIDEMIA TYPE: ICD-10-CM

## 2020-05-15 DIAGNOSIS — E11.9 CONTROLLED TYPE 2 DIABETES MELLITUS WITHOUT COMPLICATION, WITHOUT LONG-TERM CURRENT USE OF INSULIN (HCC): ICD-10-CM

## 2020-05-15 DIAGNOSIS — I10 ESSENTIAL HYPERTENSION: ICD-10-CM

## 2020-05-15 RX ORDER — CLONIDINE HYDROCHLORIDE 0.1 MG/1
TABLET ORAL
Qty: 60 TABLET | Refills: 0 | Status: SHIPPED | OUTPATIENT
Start: 2020-05-15 | End: 2020-06-14

## 2020-05-29 RX ORDER — DULAGLUTIDE 1.5 MG/.5ML
INJECTION, SOLUTION SUBCUTANEOUS
Qty: 4 PEN | Refills: 0 | Status: SHIPPED | OUTPATIENT
Start: 2020-05-29 | End: 2020-07-29

## 2020-06-06 RX ORDER — EMPAGLIFLOZIN 25 MG/1
TABLET, FILM COATED ORAL
Qty: 30 TABLET | Refills: 0 | OUTPATIENT
Start: 2020-06-06

## 2020-06-09 RX ORDER — CARVEDILOL 25 MG/1
TABLET ORAL
Qty: 30 TABLET | Refills: 0 | Status: SHIPPED | OUTPATIENT
Start: 2020-06-09 | End: 2020-07-07

## 2020-06-11 ENCOUNTER — OFFICE VISIT (OUTPATIENT)
Dept: ENDOCRINOLOGY | Age: 55
End: 2020-06-11

## 2020-06-11 VITALS
DIASTOLIC BLOOD PRESSURE: 94 MMHG | RESPIRATION RATE: 16 BRPM | SYSTOLIC BLOOD PRESSURE: 140 MMHG | WEIGHT: 315 LBS | HEIGHT: 72 IN | BODY MASS INDEX: 42.66 KG/M2

## 2020-06-11 DIAGNOSIS — E11.9 CONTROLLED TYPE 2 DIABETES MELLITUS WITHOUT COMPLICATION, WITHOUT LONG-TERM CURRENT USE OF INSULIN (HCC): Primary | ICD-10-CM

## 2020-06-11 DIAGNOSIS — E03.9 PRIMARY HYPOTHYROIDISM: ICD-10-CM

## 2020-06-11 DIAGNOSIS — R79.89 LOW TESTOSTERONE: ICD-10-CM

## 2020-06-11 DIAGNOSIS — I10 ESSENTIAL HYPERTENSION: ICD-10-CM

## 2020-06-11 DIAGNOSIS — E55.9 VITAMIN D DEFICIENCY: ICD-10-CM

## 2020-06-11 DIAGNOSIS — E78.2 MIXED HYPERLIPIDEMIA: ICD-10-CM

## 2020-06-11 PROCEDURE — 99214 OFFICE O/P EST MOD 30 MIN: CPT | Performed by: NURSE PRACTITIONER

## 2020-06-11 RX ORDER — ROSUVASTATIN CALCIUM 40 MG/1
40 TABLET, COATED ORAL NIGHTLY
Qty: 90 TABLET | Refills: 1 | Status: SHIPPED | OUTPATIENT
Start: 2020-06-11 | End: 2021-03-26

## 2020-06-11 RX ORDER — PREGABALIN 75 MG/1
75 CAPSULE ORAL 2 TIMES DAILY
Qty: 60 CAPSULE | Refills: 2 | Status: SHIPPED | OUTPATIENT
Start: 2020-06-11 | End: 2020-12-11

## 2020-06-11 RX ORDER — FLASH GLUCOSE SENSOR
1 KIT MISCELLANEOUS
Qty: 2 EACH | Refills: 5 | Status: SHIPPED | OUTPATIENT
Start: 2020-06-11

## 2020-06-11 RX ORDER — FLASH GLUCOSE SENSOR
1 KIT MISCELLANEOUS ONCE
Qty: 1 DEVICE | Refills: 0 | Status: SHIPPED | OUTPATIENT
Start: 2020-06-11 | End: 2020-07-06

## 2020-06-11 NOTE — PATIENT INSTRUCTIONS
Freestyle rocky to monitor blood sugars  lyrica 75 mg twice daily for neuropathy  Continue all current meds  Monitor blood pressure   Take crestor 40 mg once daily for cholesterol

## 2020-06-11 NOTE — PROGRESS NOTES
"Subjective   Adarsh Davison is a 55 y.o. male is here today for follow-up.  Chief Complaint   Patient presents with   • Diabetes     lab review; denies any problems or concerns; will be seeing an orthopedic doctor for surgery on his shoulder; not checking BG; interested in Alejo but doesn't know if insurance covers it.   • Hyperlipidemia   • Hypothyroidism     Blood pressure 140/94, resp. rate 16, height 182.9 cm (72\"), weight (!) 151 kg (332 lb 9.6 oz).    Current Outpatient Medications on File Prior to Visit   Medication Sig   • BD HYPODERMIC NEEDLE 18G X 1\" misc USE AS DIRECTED TO WITHDRAW TESTOSTERONE   • carvedilol (COREG) 25 MG tablet TAKE ONE TABLET BY MOUTH DAILY   • cloNIDine (CATAPRES) 0.1 MG tablet TAKE ONE TABLET BY MOUTH TWICE A DAY   • cyclobenzaprine (FLEXERIL) 10 MG tablet Take 1 tablet by mouth 3 (Three) Times a Day As Needed for Muscle Spasms.   • furosemide (LASIX) 40 MG tablet Take 40 mg by mouth daily.   • ibuprofen (ADVIL,MOTRIN) 600 MG tablet Take 1 tablet by mouth Every 6 (Six) Hours As Needed for Mild Pain  or Moderate Pain  (take with food).   • JARDIANCE 25 MG tablet TAKE ONE TABLET BY MOUTH DAILY   • levothyroxine (SYNTHROID, LEVOTHROID) 175 MCG tablet Take 1 tablet by mouth Daily.   • lisinopril (PRINIVIL,ZESTRIL) 40 MG tablet TAKE ONE TABLET BY MOUTH DAILY   • Syringe/Needle, Disp, (B-D 3CC LUER-BETTY SYR 23GX1\") 23G X 1\" 3 ML misc USE AS DIRECTED FOR TESTOSTERONE INJECTION   • Testosterone Cypionate (DEPOTESTOTERONE CYPIONATE) 200 MG/ML injection INJECT 1 ML INTRAMUSCULARLY EVERY 8 DAYS   • TRULICITY 1.5 MG/0.5ML solution pen-injector INJECT 1.5 MG UNDER THE SKIN ONCE WEEKLY   • vitamin D (ERGOCALCIFEROL) 1.25 MG (66705 UT) capsule capsule TAKE ONE CAPSULE BY MOUTH TWICE WEEKLY   • [DISCONTINUED] rosuvastatin (CRESTOR) 20 MG tablet Take 2 tablets by mouth Every Night.   • [DISCONTINUED] rosuvastatin (CRESTOR) 20 MG tablet TAKE ONE TABLET BY MOUTH ONCE NIGHTLY   • [DISCONTINUED] " rosuvastatin (CRESTOR) 20 MG tablet TAKE ONE TABLET BY MOUTH ONCE NIGHTLY     No current facility-administered medications on file prior to visit.      History reviewed. No pertinent family history.  Social History     Tobacco Use   • Smoking status: Never Smoker   • Smokeless tobacco: Never Used   Substance Use Topics   • Alcohol use: No   • Drug use: No     Allergies   Allergen Reactions   • Cialis [Tadalafil] Other (See Comments)     BODY ACHES   • Metformin Confusion   • Lortab [Hydrocodone-Acetaminophen] Rash   • Lotrel [Amlodipine Besy-Benazepril Hcl] Rash         History of Present Illness   Encounter Diagnoses   Name Primary?   • Essential hypertension    • Mixed hyperlipidemia    • Low testosterone    • Primary hypothyroidism    • Vitamin D deficiency    • Controlled type 2 diabetes mellitus without complication, without long-term current use of insulin (CMS/[facility]) Yes   55-year-old male patient here today for follow-up visit.  He has been seen for the above-mentioned problems.  He is complaining of increased neuropathic pain.  He has been on gabapentin in the past which he states helps some.  He wants to try Lyrica.  His medication list was reviewed and updated.  He is on a diuretic as well as Jardiance.  He has edema in his bilateral lower extremities 1+ pitting.  He denies eating processed foods.  He also does not eat salty foods.  He has been advised to monitor his weight and to diet exercise for weight loss.  He thinks his blood pressure might be elevated as result of having tears in his right shoulder.  He is being referred to an orthopedic doctor for further evaluation.  He is currently not checking his blood sugars and is interested in the freestyle rocky.  It may or may not be covered by his insurance so prescription has been sent to his pharmacy      The following portions of the patient's history were reviewed and updated as appropriate: allergies, current medications, past family history, past  medical history, past social history, past surgical history and problem list.    Review of Systems   Constitutional: Negative for fatigue and fever.   Respiratory: Negative for cough and shortness of breath.    Cardiovascular: Negative for chest pain.   Gastrointestinal: Negative for constipation and diarrhea.   Endocrine: Negative for cold intolerance and heat intolerance.   Neurological: Negative for dizziness, light-headedness and headaches.   Psychiatric/Behavioral: Negative for sleep disturbance.       Objective   Physical Exam   Constitutional: He is oriented to person, place, and time. He appears well-developed and well-nourished. No distress.   HENT:   Head: Normocephalic and atraumatic.   Right Ear: External ear normal.   Left Ear: External ear normal.   Nose: Nose normal.   Eyes: Pupils are equal, round, and reactive to light. Right eye exhibits no discharge. Left eye exhibits no discharge.   Neck: Normal range of motion. Neck supple. Carotid bruit is not present. No tracheal deviation, no edema and no erythema present. Thyromegaly present.   Cardiovascular: Normal rate, regular rhythm, normal heart sounds and intact distal pulses. Exam reveals no gallop and no friction rub.   No murmur heard.  Pulmonary/Chest: Effort normal and breath sounds normal. No respiratory distress. He has no wheezes. He has no rales.   Abdominal: Soft. Bowel sounds are normal. He exhibits no distension. There is no tenderness.   Musculoskeletal: Normal range of motion. He exhibits edema. He exhibits no deformity.   1+ pitting edema in lower ext   Lymphadenopathy:     He has no cervical adenopathy.   Neurological: He is alert and oriented to person, place, and time. A sensory deficit is present. Coordination normal.   Neuropathy in feet and hands    Skin: Skin is warm and dry. No rash noted. He is not diaphoretic. No erythema. No pallor.   Psychiatric: He has a normal mood and affect. His behavior is normal. Judgment and thought  content normal.   Nursing note and vitals reviewed.    Lab Results   Component Value Date    HGBA1C 6.5 (H) 05/22/2020     Lab Results   Component Value Date    GLUCOSE 187 (H) 09/16/2019    BUN 13 05/22/2020    CREATININE 0.7 05/22/2020    EGFRIFNONA 79 09/16/2019    EGFRIFAFRI 130 01/18/2019    BCR 19.0 05/22/2020    K 4.5 05/22/2020    CO2 25 05/22/2020    CALCIUM 9.6 05/22/2020    PROTENTOTREF 6.5 01/18/2019    ALBUMIN 4.2 05/22/2020    LABIL2 1.4 05/22/2020    AST 22 05/22/2020    ALT 22 05/22/2020     Lab Results   Component Value Date    CHLPL 214 (H) 05/22/2020    TRIG 325 (H) 05/22/2020    HDL 34 (L) 05/22/2020     (H) 05/22/2020     Lab Results   Component Value Date    TSH 2.030 05/22/2020    L7INEPC 6.9 10/25/2018    THYROIDAB 30 10/25/2018         Assessment/Plan   Problems Addressed this Visit        Cardiovascular and Mediastinum    HTN (hypertension)    Relevant Medications    pregabalin (Lyrica) 75 MG capsule    Hyperlipidemia    Relevant Medications    rosuvastatin (CRESTOR) 40 MG tablet    pregabalin (Lyrica) 75 MG capsule       Digestive    Vitamin D deficiency    Relevant Medications    pregabalin (Lyrica) 75 MG capsule       Endocrine    Controlled type 2 diabetes mellitus without complication, without long-term current use of insulin (CMS/[facility]) - Primary    Relevant Medications    pregabalin (Lyrica) 75 MG capsule    Primary hypothyroidism    Relevant Medications    pregabalin (Lyrica) 75 MG capsule       Other    Low testosterone    Relevant Medications    pregabalin (Lyrica) 75 MG capsule        Patient was evaluated.  Metabolically and clinically he presents improved and stable.  His A1c is at goal of less than 7.  He is not at risk for hypoglycemia on Jardiance and Trulicity.  He is on blood pressure medication has been advised to monitor his blood pressure.  A freestyle rocky has been sent to his pharmacy.  A Juice has been requested to evaluate for the Lyrica prescription as well  as testosterone use.  He is to start Lyrica 75 mg twice daily.  If it fails to improve his neuropathy he can let us know to increase his insulin.  He has been taking Crestor 20 mg 2 pills at night.  His cholesterol medication has been changed to rosuvastatin 40 mg once daily.  Patient was educated today regarding mechanism of action of his current diabetes medications.  He will follow-up in 6 months with Dr. Ray or myself with labs prior.  His thyroid hormone is in satisfactory range

## 2020-06-13 DIAGNOSIS — R79.89 LOW TESTOSTERONE: ICD-10-CM

## 2020-06-13 DIAGNOSIS — E78.5 DYSLIPIDEMIA WITH HIGH LDL AND LOW HDL: ICD-10-CM

## 2020-06-13 DIAGNOSIS — E11.9 CONTROLLED TYPE 2 DIABETES MELLITUS WITHOUT COMPLICATION, WITHOUT LONG-TERM CURRENT USE OF INSULIN (HCC): ICD-10-CM

## 2020-06-13 DIAGNOSIS — R63.5 ABNORMAL WEIGHT GAIN: ICD-10-CM

## 2020-06-13 DIAGNOSIS — I10 ESSENTIAL HYPERTENSION: ICD-10-CM

## 2020-06-13 DIAGNOSIS — E03.9 HYPOTHYROIDISM, UNSPECIFIED TYPE: ICD-10-CM

## 2020-06-13 DIAGNOSIS — E78.5 HYPERLIPIDEMIA, UNSPECIFIED HYPERLIPIDEMIA TYPE: ICD-10-CM

## 2020-06-14 RX ORDER — CLONIDINE HYDROCHLORIDE 0.1 MG/1
TABLET ORAL
Qty: 60 TABLET | Refills: 0 | Status: SHIPPED | OUTPATIENT
Start: 2020-06-14 | End: 2020-07-13

## 2020-06-15 RX ORDER — EMPAGLIFLOZIN 25 MG/1
TABLET, FILM COATED ORAL
Qty: 30 TABLET | Refills: 0 | Status: SHIPPED | OUTPATIENT
Start: 2020-06-15 | End: 2020-07-13

## 2020-07-01 ENCOUNTER — TELEPHONE (OUTPATIENT)
Dept: ENDOCRINOLOGY | Age: 55
End: 2020-07-01

## 2020-07-01 NOTE — TELEPHONE ENCOUNTER
Pt has been updated about his PA request for pergablin 75 mg. PA has been submitted, awaiting results. DB

## 2020-07-06 RX ORDER — FLASH GLUCOSE SCANNING READER
EACH MISCELLANEOUS
Qty: 1 DEVICE | Refills: 0 | Status: SHIPPED | OUTPATIENT
Start: 2020-07-06

## 2020-07-07 DIAGNOSIS — I10 ESSENTIAL HYPERTENSION: ICD-10-CM

## 2020-07-07 DIAGNOSIS — E03.9 HYPOTHYROIDISM, UNSPECIFIED TYPE: ICD-10-CM

## 2020-07-07 DIAGNOSIS — E78.5 HYPERLIPIDEMIA, UNSPECIFIED HYPERLIPIDEMIA TYPE: ICD-10-CM

## 2020-07-07 DIAGNOSIS — R79.89 LOW TESTOSTERONE: ICD-10-CM

## 2020-07-07 DIAGNOSIS — R73.03 PRE-DIABETES: ICD-10-CM

## 2020-07-07 RX ORDER — LEVOTHYROXINE SODIUM 175 UG/1
TABLET ORAL
Qty: 30 TABLET | Refills: 5 | Status: SHIPPED | OUTPATIENT
Start: 2020-07-07 | End: 2021-01-04

## 2020-07-07 RX ORDER — CARVEDILOL 25 MG/1
TABLET ORAL
Qty: 30 TABLET | Refills: 5 | Status: SHIPPED | OUTPATIENT
Start: 2020-07-07 | End: 2021-04-05

## 2020-07-12 DIAGNOSIS — E03.9 HYPOTHYROIDISM, UNSPECIFIED TYPE: ICD-10-CM

## 2020-07-12 DIAGNOSIS — I10 ESSENTIAL HYPERTENSION: ICD-10-CM

## 2020-07-12 DIAGNOSIS — E78.5 HYPERLIPIDEMIA, UNSPECIFIED HYPERLIPIDEMIA TYPE: ICD-10-CM

## 2020-07-12 DIAGNOSIS — R63.5 ABNORMAL WEIGHT GAIN: ICD-10-CM

## 2020-07-12 DIAGNOSIS — E78.5 DYSLIPIDEMIA WITH HIGH LDL AND LOW HDL: ICD-10-CM

## 2020-07-12 DIAGNOSIS — R79.89 LOW TESTOSTERONE: ICD-10-CM

## 2020-07-12 DIAGNOSIS — E11.9 CONTROLLED TYPE 2 DIABETES MELLITUS WITHOUT COMPLICATION, WITHOUT LONG-TERM CURRENT USE OF INSULIN (HCC): ICD-10-CM

## 2020-07-13 RX ORDER — CLONIDINE HYDROCHLORIDE 0.1 MG/1
TABLET ORAL
Qty: 60 TABLET | Refills: 0 | Status: SHIPPED | OUTPATIENT
Start: 2020-07-13 | End: 2020-08-10

## 2020-07-13 RX ORDER — EMPAGLIFLOZIN 25 MG/1
TABLET, FILM COATED ORAL
Qty: 30 TABLET | Refills: 0 | Status: SHIPPED | OUTPATIENT
Start: 2020-07-13 | End: 2020-08-10

## 2020-07-29 RX ORDER — DULAGLUTIDE 1.5 MG/.5ML
INJECTION, SOLUTION SUBCUTANEOUS
Qty: 4 PEN | Refills: 0 | Status: SHIPPED | OUTPATIENT
Start: 2020-07-29 | End: 2021-09-24

## 2020-08-09 DIAGNOSIS — E11.9 CONTROLLED TYPE 2 DIABETES MELLITUS WITHOUT COMPLICATION, WITHOUT LONG-TERM CURRENT USE OF INSULIN (HCC): ICD-10-CM

## 2020-08-09 DIAGNOSIS — E03.9 HYPOTHYROIDISM, UNSPECIFIED TYPE: ICD-10-CM

## 2020-08-09 DIAGNOSIS — E78.5 HYPERLIPIDEMIA, UNSPECIFIED HYPERLIPIDEMIA TYPE: ICD-10-CM

## 2020-08-09 DIAGNOSIS — E78.5 DYSLIPIDEMIA WITH HIGH LDL AND LOW HDL: ICD-10-CM

## 2020-08-09 DIAGNOSIS — I10 ESSENTIAL HYPERTENSION: ICD-10-CM

## 2020-08-09 DIAGNOSIS — R79.89 LOW TESTOSTERONE: ICD-10-CM

## 2020-08-09 DIAGNOSIS — R63.5 ABNORMAL WEIGHT GAIN: ICD-10-CM

## 2020-08-10 RX ORDER — CLONIDINE HYDROCHLORIDE 0.1 MG/1
TABLET ORAL
Qty: 60 TABLET | Refills: 0 | Status: SHIPPED | OUTPATIENT
Start: 2020-08-10 | End: 2020-09-08

## 2020-08-10 RX ORDER — EMPAGLIFLOZIN 25 MG/1
TABLET, FILM COATED ORAL
Qty: 30 TABLET | Refills: 0 | Status: SHIPPED | OUTPATIENT
Start: 2020-08-10 | End: 2020-09-08

## 2020-09-01 ENCOUNTER — APPOINTMENT (OUTPATIENT)
Dept: GENERAL RADIOLOGY | Facility: HOSPITAL | Age: 55
End: 2020-09-01

## 2020-09-01 ENCOUNTER — APPOINTMENT (OUTPATIENT)
Dept: CT IMAGING | Facility: HOSPITAL | Age: 55
End: 2020-09-01

## 2020-09-01 ENCOUNTER — HOSPITAL ENCOUNTER (OUTPATIENT)
Facility: HOSPITAL | Age: 55
Setting detail: OBSERVATION
Discharge: HOME | End: 2020-09-03
Attending: EMERGENCY MEDICINE | Admitting: INTERNAL MEDICINE

## 2020-09-01 DIAGNOSIS — I95.9 HYPOTENSION, UNSPECIFIED HYPOTENSION TYPE: ICD-10-CM

## 2020-09-01 DIAGNOSIS — R91.1 PULMONARY NODULE: Primary | ICD-10-CM

## 2020-09-01 DIAGNOSIS — R42 LIGHTHEADEDNESS: ICD-10-CM

## 2020-09-01 DIAGNOSIS — R06.00 DYSPNEA, UNSPECIFIED TYPE: ICD-10-CM

## 2020-09-01 LAB
ALBUMIN SERPL-MCNC: 4.1 G/DL (ref 3.5–5.2)
ALBUMIN/GLOB SERPL: 1.4 G/DL
ALP SERPL-CCNC: 60 U/L (ref 39–117)
ALT SERPL W P-5'-P-CCNC: 34 U/L (ref 1–41)
ANION GAP SERPL CALCULATED.3IONS-SCNC: 8.9 MMOL/L (ref 5–15)
AST SERPL-CCNC: 23 U/L (ref 1–40)
BASOPHILS # BLD AUTO: 0.03 10*3/MM3 (ref 0–0.2)
BASOPHILS NFR BLD AUTO: 0.3 % (ref 0–1.5)
BILIRUB SERPL-MCNC: 0.9 MG/DL (ref 0–1.2)
BUN SERPL-MCNC: 10 MG/DL (ref 6–20)
BUN/CREAT SERPL: 9.1 (ref 7–25)
CALCIUM SPEC-SCNC: 9.7 MG/DL (ref 8.6–10.5)
CHLORIDE SERPL-SCNC: 99 MMOL/L (ref 98–107)
CO2 SERPL-SCNC: 26.1 MMOL/L (ref 22–29)
CREAT SERPL-MCNC: 1.1 MG/DL (ref 0.76–1.27)
DEPRECATED RDW RBC AUTO: 43.9 FL (ref 37–54)
EOSINOPHIL # BLD AUTO: 0.16 10*3/MM3 (ref 0–0.4)
EOSINOPHIL NFR BLD AUTO: 1.7 % (ref 0.3–6.2)
ERYTHROCYTE [DISTWIDTH] IN BLOOD BY AUTOMATED COUNT: 13.9 % (ref 12.3–15.4)
GFR SERPL CREATININE-BSD FRML MDRD: 69 ML/MIN/1.73
GLOBULIN UR ELPH-MCNC: 2.9 GM/DL
GLUCOSE SERPL-MCNC: 113 MG/DL (ref 65–99)
HCT VFR BLD AUTO: 44 % (ref 37.5–51)
HGB BLD-MCNC: 15.5 G/DL (ref 13–17.7)
HOLD SPECIMEN: NORMAL
HOLD SPECIMEN: NORMAL
IMM GRANULOCYTES # BLD AUTO: 0.05 10*3/MM3 (ref 0–0.05)
IMM GRANULOCYTES NFR BLD AUTO: 0.5 % (ref 0–0.5)
INR PPP: 1.25 (ref 0.9–1.1)
LYMPHOCYTES # BLD AUTO: 2.26 10*3/MM3 (ref 0.7–3.1)
LYMPHOCYTES NFR BLD AUTO: 23.5 % (ref 19.6–45.3)
MCH RBC QN AUTO: 30.5 PG (ref 26.6–33)
MCHC RBC AUTO-ENTMCNC: 35.2 G/DL (ref 31.5–35.7)
MCV RBC AUTO: 86.6 FL (ref 79–97)
MONOCYTES # BLD AUTO: 0.99 10*3/MM3 (ref 0.1–0.9)
MONOCYTES NFR BLD AUTO: 10.3 % (ref 5–12)
NEUTROPHILS NFR BLD AUTO: 6.11 10*3/MM3 (ref 1.7–7)
NEUTROPHILS NFR BLD AUTO: 63.7 % (ref 42.7–76)
NRBC BLD AUTO-RTO: 0 /100 WBC (ref 0–0.2)
NT-PROBNP SERPL-MCNC: 92 PG/ML (ref 0–900)
PLATELET # BLD AUTO: 225 10*3/MM3 (ref 140–450)
PMV BLD AUTO: 11.5 FL (ref 6–12)
POTASSIUM SERPL-SCNC: 3.7 MMOL/L (ref 3.5–5.2)
PROT SERPL-MCNC: 7 G/DL (ref 6–8.5)
PROTHROMBIN TIME: 15.5 SECONDS (ref 11.7–14.2)
RBC # BLD AUTO: 5.08 10*6/MM3 (ref 4.14–5.8)
SODIUM SERPL-SCNC: 134 MMOL/L (ref 136–145)
TROPONIN T SERPL-MCNC: <0.01 NG/ML (ref 0–0.03)
WBC # BLD AUTO: 9.6 10*3/MM3 (ref 3.4–10.8)
WHOLE BLOOD HOLD SPECIMEN: NORMAL
WHOLE BLOOD HOLD SPECIMEN: NORMAL

## 2020-09-01 PROCEDURE — 0202U NFCT DS 22 TRGT SARS-COV-2: CPT | Performed by: EMERGENCY MEDICINE

## 2020-09-01 PROCEDURE — G0378 HOSPITAL OBSERVATION PER HR: HCPCS

## 2020-09-01 PROCEDURE — 93005 ELECTROCARDIOGRAM TRACING: CPT | Performed by: EMERGENCY MEDICINE

## 2020-09-01 PROCEDURE — 84484 ASSAY OF TROPONIN QUANT: CPT | Performed by: EMERGENCY MEDICINE

## 2020-09-01 PROCEDURE — 85025 COMPLETE CBC W/AUTO DIFF WBC: CPT | Performed by: EMERGENCY MEDICINE

## 2020-09-01 PROCEDURE — 85610 PROTHROMBIN TIME: CPT | Performed by: EMERGENCY MEDICINE

## 2020-09-01 PROCEDURE — 71045 X-RAY EXAM CHEST 1 VIEW: CPT

## 2020-09-01 PROCEDURE — 93010 ELECTROCARDIOGRAM REPORT: CPT | Performed by: INTERNAL MEDICINE

## 2020-09-01 PROCEDURE — 83880 ASSAY OF NATRIURETIC PEPTIDE: CPT | Performed by: EMERGENCY MEDICINE

## 2020-09-01 PROCEDURE — 99284 EMERGENCY DEPT VISIT MOD MDM: CPT

## 2020-09-01 PROCEDURE — 71275 CT ANGIOGRAPHY CHEST: CPT

## 2020-09-01 PROCEDURE — 80053 COMPREHEN METABOLIC PANEL: CPT | Performed by: EMERGENCY MEDICINE

## 2020-09-01 PROCEDURE — 0 IOPAMIDOL PER 1 ML: Performed by: EMERGENCY MEDICINE

## 2020-09-01 RX ORDER — NICOTINE POLACRILEX 4 MG
15 LOZENGE BUCCAL
Status: DISCONTINUED | OUTPATIENT
Start: 2020-09-01 | End: 2020-09-03 | Stop reason: HOSPADM

## 2020-09-01 RX ORDER — DEXTROSE MONOHYDRATE 25 G/50ML
25 INJECTION, SOLUTION INTRAVENOUS
Status: DISCONTINUED | OUTPATIENT
Start: 2020-09-01 | End: 2020-09-03 | Stop reason: HOSPADM

## 2020-09-01 RX ORDER — ACETAMINOPHEN 325 MG/1
650 TABLET ORAL EVERY 4 HOURS PRN
Status: DISCONTINUED | OUTPATIENT
Start: 2020-09-01 | End: 2020-09-03 | Stop reason: HOSPADM

## 2020-09-01 RX ORDER — NITROGLYCERIN 0.4 MG/1
0.4 TABLET SUBLINGUAL
Status: DISCONTINUED | OUTPATIENT
Start: 2020-09-01 | End: 2020-09-03 | Stop reason: HOSPADM

## 2020-09-01 RX ORDER — SODIUM CHLORIDE 9 MG/ML
75 INJECTION, SOLUTION INTRAVENOUS CONTINUOUS
Status: DISCONTINUED | OUTPATIENT
Start: 2020-09-01 | End: 2020-09-02

## 2020-09-01 RX ORDER — SODIUM CHLORIDE 0.9 % (FLUSH) 0.9 %
10 SYRINGE (ML) INJECTION AS NEEDED
Status: DISCONTINUED | OUTPATIENT
Start: 2020-09-01 | End: 2020-09-03 | Stop reason: HOSPADM

## 2020-09-01 RX ORDER — SODIUM CHLORIDE 0.9 % (FLUSH) 0.9 %
10 SYRINGE (ML) INJECTION EVERY 12 HOURS SCHEDULED
Status: DISCONTINUED | OUTPATIENT
Start: 2020-09-01 | End: 2020-09-03 | Stop reason: HOSPADM

## 2020-09-01 RX ORDER — CALCIUM CARBONATE 200(500)MG
2 TABLET,CHEWABLE ORAL 2 TIMES DAILY PRN
Status: DISCONTINUED | OUTPATIENT
Start: 2020-09-01 | End: 2020-09-03 | Stop reason: HOSPADM

## 2020-09-01 RX ORDER — ACETAMINOPHEN 650 MG/1
650 SUPPOSITORY RECTAL EVERY 4 HOURS PRN
Status: DISCONTINUED | OUTPATIENT
Start: 2020-09-01 | End: 2020-09-03 | Stop reason: HOSPADM

## 2020-09-01 RX ORDER — BISACODYL 5 MG/1
5 TABLET, DELAYED RELEASE ORAL DAILY PRN
Status: DISCONTINUED | OUTPATIENT
Start: 2020-09-01 | End: 2020-09-03 | Stop reason: HOSPADM

## 2020-09-01 RX ORDER — ACETAMINOPHEN 160 MG/5ML
650 SOLUTION ORAL EVERY 4 HOURS PRN
Status: DISCONTINUED | OUTPATIENT
Start: 2020-09-01 | End: 2020-09-03 | Stop reason: HOSPADM

## 2020-09-01 RX ADMIN — IOPAMIDOL 95 ML: 755 INJECTION, SOLUTION INTRAVENOUS at 20:55

## 2020-09-01 RX ADMIN — SODIUM CHLORIDE 500 ML: 9 INJECTION, SOLUTION INTRAVENOUS at 22:26

## 2020-09-01 NOTE — ED NOTES
Pt c/o soa that started last night. Pt is xarelto since June due to PEs. Pt  Had egd  Done yesterday and was off xarelto Saturday and Sunday. soa worse with exertion. Denies fever. Reports some chest pain and cough.     Pt had mask on when placed in room. RN wore goggles, gown, gloves and surgical mask upon entering room.      Mariama Hoskins, RN  09/01/20 3571

## 2020-09-01 NOTE — ED PROVIDER NOTES
EMERGENCY DEPARTMENT ENCOUNTER    CHIEF COMPLAINT  Chief Complaint: Shortness of air  History given by: Patient  History limited by: Nothing  Room Number: 28/28  PMD: Harrison Chin MD  Gastroenterologist: Dr. Radha Wilson    HPI:  Pt is a 55 y.o. male complaining of shortness of air intermittent for the past several weeks, worse since approximately 8 PM last night and accompanied by midsternal chest discomfort that is been constant since that time.  Patient reports ongoing cough unchanged recently, denies fevers, abdominal pain, denies nausea/vomiting or changes in urinary or bowel habits or new swelling of extremities.  Patient denies blood or black color to the stool  Patient reports he had an EGD yesterday, had been off Xarelto for a few days prior to the procedure and just resumed his Xarelto this morning with last dose at 9 AM.    Duration: Several weeks  Associated Symptoms: chest pain  Aggravating Factors: Nothing  Alleviating Factors: Nothing  Treatment before arrival: Regular medications at home including Xarelto, Coreg, lisinopril, Lasix, Catapres this morning at 9 AM    Upon review of the patient's chart it is noted:  Patient has a history of hypertension, elevated cholesterol, type 2 diabetes  Patient had recent admission at [facility] from 8/29 through 8/34 seen in the emergency department for something stuck in his throat and discharged with a diagnosis of dysphasia at the time of his discharge he complained of shortness of air.  He had an EGD done 8/31/2020 normal with empiric dilation of esophagus with small superficial tear, CTA chest done 8/22/2020 with no evidence for PE, discharged on Xarelto.  Patient reported to be on 3 L at home during that admission  Patient seen in the [facility] emergency department also on 27 August, and 22 August with reports of shortness of air    PAST MEDICAL HISTORY  Active Ambulatory Problems     Diagnosis Date Noted   •  Dyslipidemia with high LDL and low HDL 04/06/2015   • HTN (hypertension) 09/09/2012   • Hyperlipidemia 07/13/2018   • Low testosterone 07/07/2014   • Toenail fungus 07/13/2018   • Fissure of skin 07/13/2018   • Abnormal weight gain 07/13/2018   • Controlled type 2 diabetes mellitus without complication, without long-term current use of insulin (CMS/HCC) 07/19/2018   • Multiple lung nodules on CT 09/07/2018   • Vitamin D deficiency 10/29/2018   • Primary hypothyroidism 08/30/2019     Resolved Ambulatory Problems     Diagnosis Date Noted   • No Resolved Ambulatory Problems     Past Medical History:   Diagnosis Date   • Arthritis    • Carpal tunnel syndrome of right wrist    • Disease of thyroid gland    • Edema    • Hypertension    • Hypothyroidism    • Labral tear of shoulder    • Pulmonary embolism (CMS/HCC)    • Type 2 diabetes mellitus (CMS/HCC)        PAST SURGICAL HISTORY  Past Surgical History:   Procedure Laterality Date   • SHOULDER ARTHROSCOPY W/ ROTATOR CUFF REPAIR Left 10/19/2016    Procedure: LEFT SHOULDER ARTHROSCOPY WITH ROTATOR CUFF REPAIR WITH ACROMIOPLASTY, DISTAL CLAVICLE EXCISION, LABRAL DEBRIDEMENT;  Surgeon: David Allison MD;  [place] [facility] [place] [facility]  Service:    • TONSILLECTOMY     • UVULECTOMY         FAMILY HISTORY  History reviewed. No pertinent family history.    SOCIAL HISTORY  Social History     Socioeconomic History   • Marital status:      Spouse name: Not on file   • Number of children: Not on file   • Years of education: Not on file   • Highest education level: Not on file   Tobacco Use   • Smoking status: Never Smoker   • Smokeless tobacco: Never Used   Substance and Sexual Activity   • Alcohol use: No   • Drug use: No       ALLERGIES  Cialis [tadalafil]; Metformin; Lortab [hydrocodone-acetaminophen]; and Lotrel [amlodipine besy-benazepril hcl]    REVIEW OF SYSTEMS  Review of Systems   Constitutional: Negative for chills and fever.   HENT: Negative for sore throat and  trouble swallowing.    Eyes: Negative for visual disturbance.   Respiratory: Positive for shortness of breath. Negative for cough.    Cardiovascular: Positive for chest pain. Negative for leg swelling.   Gastrointestinal: Negative for abdominal pain, diarrhea and vomiting.   Endocrine: Negative.    Genitourinary: Negative for decreased urine volume and frequency.   Musculoskeletal: Negative for neck pain.   Skin: Negative for rash.   Allergic/Immunologic: Negative.    Neurological: Negative for weakness and numbness.   Hematological: Negative.    Psychiatric/Behavioral: Negative.    All other systems reviewed and are negative.      PHYSICAL EXAM  ED Triage Vitals [09/01/20 1848]   Temp Heart Rate Resp BP SpO2   98.9 °F (37.2 °C) 79 18 -- 93 %      Temp src Heart Rate Source Patient Position BP Location FiO2 (%)   Tympanic -- -- -- --       Physical Exam   Constitutional: He is oriented to person, place, and time. He appears distressed.   HENT:   Head: Normocephalic and atraumatic.   Eyes: EOM are normal.   Neck: Normal range of motion.   Cardiovascular: Normal rate, regular rhythm and normal heart sounds.   No murmur heard.  Pulses:       Posterior tibial pulses are 2+ on the right side, and 2+ on the left side.   Pulmonary/Chest: Effort normal and breath sounds normal. No respiratory distress. He has no wheezes.   Abdominal: Soft. Bowel sounds are normal. There is no tenderness. There is no rebound and no guarding.   Musculoskeletal: Normal range of motion. He exhibits no edema.   Neurological: He is alert and oriented to person, place, and time.   Skin: Skin is warm and dry.   Psychiatric: Affect normal.   Nursing note and vitals reviewed.      LAB RESULTS  Lab Results (last 24 hours)     Procedure Component Value Units Date/Time    CBC & Differential [[id number]] Collected:  09/01/20 1913    Specimen:  Blood Updated:  09/01/20 1933    Narrative:       The following orders were created for panel order CBC &  Differential.  Procedure                               Abnormality         Status                     ---------                               -----------         ------                     CBC Auto Differential[[id number]]        Abnormal            Final result                 Please view results for these tests on the individual orders.    Comprehensive Metabolic Panel [[id number]]  (Abnormal) Collected:  09/01/20 1913    Specimen:  Blood Updated:  09/01/20 2009     Glucose 113 mg/dL      BUN 10 mg/dL      Creatinine 1.10 mg/dL      Sodium 134 mmol/L      Potassium 3.7 mmol/L      Chloride 99 mmol/L      CO2 26.1 mmol/L      Calcium 9.7 mg/dL      Total Protein 7.0 g/dL      Albumin 4.10 g/dL      ALT (SGPT) 34 U/L      AST (SGOT) 23 U/L      Alkaline Phosphatase 60 U/L      Total Bilirubin 0.9 mg/dL      eGFR Non African Amer 69 mL/min/1.73      Globulin 2.9 gm/dL      A/G Ratio 1.4 g/dL      BUN/Creatinine Ratio 9.1     Anion Gap 8.9 mmol/L     Narrative:       GFR Normal >60  Chronic Kidney Disease <60  Kidney Failure <15      BNP [[id number]]  (Normal) Collected:  09/01/20 1913    Specimen:  Blood Updated:  09/01/20 2004     proBNP 92.0 pg/mL     Narrative:       Among patients with dyspnea, NT-proBNP is highly sensitive for the detection of acute congestive heart failure. In addition NT-proBNP of <300 pg/ml effectively rules out acute congestive heart failure with 99% negative predictive value.    Results may be falsely decreased if patient taking Biotin.      Troponin [[id number]]  (Normal) Collected:  09/01/20 1913    Specimen:  Blood Updated:  09/01/20 2006     Troponin T <0.010 ng/mL     Narrative:       Troponin T Reference Range:  <= 0.03 ng/mL-   Negative for AMI  >0.03 ng/mL-     Abnormal for myocardial necrosis.  Clinicians would have to utilize clinical acumen, EKG, Troponin and serial changes to determine if it is an Acute Myocardial Infarction or myocardial injury due to an underlying chronic  condition.       Results may be falsely decreased if patient taking Biotin.      Protime-INR [[id number]]  (Abnormal) Collected:  09/01/20 1913    Specimen:  Blood Updated:  09/01/20 1933     Protime 15.5 Seconds      INR 1.25    CBC Auto Differential [[id number]]  (Abnormal) Collected:  09/01/20 1913    Specimen:  Blood Updated:  09/01/20 1933     WBC 9.60 10*3/mm3      RBC 5.08 10*6/mm3      Hemoglobin 15.5 g/dL      Hematocrit 44.0 %      MCV 86.6 fL      MCH 30.5 pg      MCHC 35.2 g/dL      RDW 13.9 %      RDW-SD 43.9 fl      MPV 11.5 fL      Platelets 225 10*3/mm3      Neutrophil % 63.7 %      Lymphocyte % 23.5 %      Monocyte % 10.3 %      Eosinophil % 1.7 %      Basophil % 0.3 %      Immature Grans % 0.5 %      Neutrophils, Absolute 6.11 10*3/mm3      Lymphocytes, Absolute 2.26 10*3/mm3      Monocytes, Absolute 0.99 10*3/mm3      Eosinophils, Absolute 0.16 10*3/mm3      Basophils, Absolute 0.03 10*3/mm3      Immature Grans, Absolute 0.05 10*3/mm3      nRBC 0.0 /100 WBC           I ordered the above labs and reviewed the results    RADIOLOGY  CT Angiogram Chest   Preliminary Result   1. A 1 cm right middle lobe pulmonary nodule with irregular margins.   This could represent inflammatory/infectious nodule though a neoplasm   could also have this appearance and this needs further evaluation.   Recommend referral to [facility].   2. Mild right hilar yomi enlargement. Mildly enlarged subcarinal lymph   node.   3. No evidence for pulmonary thromboembolic disease.       Discussed with Dr. Mondragon in the emergency department on 09/01/2020 at   9:30 PM.          XR Chest 1 View   Final Result           I ordered the above noted radiological studies. Interpreted by radiologist and discussed with Dr Dunham, radiologist. Viewed by me in PACS.       PROCEDURES  Procedures      PROGRESS AND CONSULTS  ED Course as of Sep 01 2301   Tue Sep 01, 2020   2217 Maih ROLDAN reports patient  complaining of lightheadedness, feeling like he was going to pass out on orthostatics and with ambulation.  Sats 95% or above with ambulation on room air    [TO]   2235 Discussed with JUAN Hope, provider on-call for [facility] who is aware of patient's presentation, low blood pressures in the emergency department today CT significant for pulmonary nodule with adenopathy that needs close follow-up for further testing, treatment as needed and adjustment of his medications with further testing, treatment as needed    [TO]      ED Course User Index  [TO] Anamaria Mondragon MD     EKG          EKG time: 1918  Rhythm/Rate: Sinus rhythm, rate in the 60s  P waves and AR: Normal P waves, borderline MOISES  QRS, axis: Unremarkable QRS  ST and T waves: Unremarkable ST/T wave findings  Borderline QT, somewhat limited by baseline artifact    Interpreted Contemporaneously by me, independently viewed  changed compared to prior 8/10/2016, now rate decreased and borderline QT    Discussed with the patient and family at bedside, brother physically in the room with sister on the phone discussed imaging results include including lung abnormality that will require further follow-up testing and treatment due to risk of abnormal growth of cells such as cancer which could be life-threatening and disabling.  Patient voices understanding that he will be admitted for low blood pressures voices understanding that he needs to follow closely for outpatient evaluation of his right lung abnormalities and agrees to admission.  Patient reports there is no chance that he could have accidentally taken more than 1 dose of his medications at home today.    MEDICAL DECISION MAKING  Results were reviewed/discussed with the patient and they were also made aware of online access. Pt also made aware that some labs, such as cultures, will not be resulted during ER visit and follow up with PMD is necessary.       MDM       DIAGNOSIS  Final diagnoses:   Pulmonary nodule    Dyspnea, unspecified type   Hypotension, unspecified hypotension type   Lightheadedness       DISPOSITION  ADMISSION    Discussed treatment plan and reason for admission with pt/family and admitting physician.  Pt/family voiced understanding of the plan for admission for further testing/treatment as needed.         Latest Documented Vital Signs:  As of 23:01  BP- 98/64 HR- 58 Temp- 98.9 °F (37.2 °C) (Tympanic) O2 sat- 94%    --  Patient was wearing facemask when I entered the room and throughout our encounter. Full protective equipment was worn throughout this patient encounter including a face mask, eye protection and gloves. Hand hygiene was performed before donning protective equipment and after removal when leaving the room.      Anamaria Mondragon MD  09/01/20 [id number]

## 2020-09-02 PROBLEM — K21.9 GERD WITHOUT ESOPHAGITIS: Status: ACTIVE | Noted: 2020-09-02

## 2020-09-02 PROBLEM — E66.9 OBESITY (BMI 30-39.9): Status: ACTIVE | Noted: 2020-09-02

## 2020-09-02 PROBLEM — Z86.711 HISTORY OF PULMONARY EMBOLISM: Status: ACTIVE | Noted: 2020-09-02

## 2020-09-02 PROBLEM — R06.00 DYSPNEA: Status: ACTIVE | Noted: 2020-09-02

## 2020-09-02 LAB
ANION GAP SERPL CALCULATED.3IONS-SCNC: 8.3 MMOL/L (ref 5–15)
B PARAPERT DNA SPEC QL NAA+PROBE: NOT DETECTED
B PERT DNA SPEC QL NAA+PROBE: NOT DETECTED
BUN SERPL-MCNC: 10 MG/DL (ref 6–20)
BUN/CREAT SERPL: 11.1 (ref 7–25)
C PNEUM DNA NPH QL NAA+NON-PROBE: NOT DETECTED
CALCIUM SPEC-SCNC: 8.8 MG/DL (ref 8.6–10.5)
CHLORIDE SERPL-SCNC: 102 MMOL/L (ref 98–107)
CO2 SERPL-SCNC: 26.7 MMOL/L (ref 22–29)
CREAT SERPL-MCNC: 0.9 MG/DL (ref 0.76–1.27)
DEPRECATED RDW RBC AUTO: 45.9 FL (ref 37–54)
ERYTHROCYTE [DISTWIDTH] IN BLOOD BY AUTOMATED COUNT: 14.2 % (ref 12.3–15.4)
FLUAV H1 2009 PAND RNA NPH QL NAA+PROBE: NOT DETECTED
FLUAV H1 HA GENE NPH QL NAA+PROBE: NOT DETECTED
FLUAV H3 RNA NPH QL NAA+PROBE: NOT DETECTED
FLUAV SUBTYP SPEC NAA+PROBE: NOT DETECTED
FLUBV RNA ISLT QL NAA+PROBE: NOT DETECTED
GFR SERPL CREATININE-BSD FRML MDRD: 88 ML/MIN/1.73
GLUCOSE BLDC GLUCOMTR-MCNC: 102 MG/DL (ref 70–130)
GLUCOSE BLDC GLUCOMTR-MCNC: 106 MG/DL (ref 70–130)
GLUCOSE BLDC GLUCOMTR-MCNC: 112 MG/DL (ref 70–130)
GLUCOSE BLDC GLUCOMTR-MCNC: 136 MG/DL (ref 70–130)
GLUCOSE BLDC GLUCOMTR-MCNC: 146 MG/DL (ref 70–130)
GLUCOSE SERPL-MCNC: 109 MG/DL (ref 65–99)
HADV DNA SPEC NAA+PROBE: NOT DETECTED
HBA1C MFR BLD: 6.2 % (ref 4.8–5.6)
HCOV 229E RNA SPEC QL NAA+PROBE: NOT DETECTED
HCOV HKU1 RNA SPEC QL NAA+PROBE: NOT DETECTED
HCOV NL63 RNA SPEC QL NAA+PROBE: NOT DETECTED
HCOV OC43 RNA SPEC QL NAA+PROBE: NOT DETECTED
HCT VFR BLD AUTO: 43.5 % (ref 37.5–51)
HGB BLD-MCNC: 14.8 G/DL (ref 13–17.7)
HMPV RNA NPH QL NAA+NON-PROBE: NOT DETECTED
HPIV1 RNA SPEC QL NAA+PROBE: NOT DETECTED
HPIV2 RNA SPEC QL NAA+PROBE: NOT DETECTED
HPIV3 RNA NPH QL NAA+PROBE: NOT DETECTED
HPIV4 P GENE NPH QL NAA+PROBE: NOT DETECTED
M PNEUMO IGG SER IA-ACNC: NOT DETECTED
MCH RBC QN AUTO: 30.5 PG (ref 26.6–33)
MCHC RBC AUTO-ENTMCNC: 34 G/DL (ref 31.5–35.7)
MCV RBC AUTO: 89.5 FL (ref 79–97)
PLATELET # BLD AUTO: 219 10*3/MM3 (ref 140–450)
PMV BLD AUTO: 11.5 FL (ref 6–12)
POTASSIUM SERPL-SCNC: 3.8 MMOL/L (ref 3.5–5.2)
RBC # BLD AUTO: 4.86 10*6/MM3 (ref 4.14–5.8)
RHINOVIRUS RNA SPEC NAA+PROBE: NOT DETECTED
RSV RNA NPH QL NAA+NON-PROBE: NOT DETECTED
SARS-COV-2 RNA NPH QL NAA+NON-PROBE: NOT DETECTED
SODIUM SERPL-SCNC: 137 MMOL/L (ref 136–145)
TROPONIN T SERPL-MCNC: <0.01 NG/ML (ref 0–0.03)
WBC # BLD AUTO: 8.86 10*3/MM3 (ref 3.4–10.8)

## 2020-09-02 PROCEDURE — 92610 EVALUATE SWALLOWING FUNCTION: CPT

## 2020-09-02 PROCEDURE — 84484 ASSAY OF TROPONIN QUANT: CPT | Performed by: NURSE PRACTITIONER

## 2020-09-02 PROCEDURE — G0378 HOSPITAL OBSERVATION PER HR: HCPCS

## 2020-09-02 PROCEDURE — 85027 COMPLETE CBC AUTOMATED: CPT | Performed by: NURSE PRACTITIONER

## 2020-09-02 PROCEDURE — 96360 HYDRATION IV INFUSION INIT: CPT

## 2020-09-02 PROCEDURE — 80048 BASIC METABOLIC PNL TOTAL CA: CPT | Performed by: NURSE PRACTITIONER

## 2020-09-02 PROCEDURE — 82962 GLUCOSE BLOOD TEST: CPT

## 2020-09-02 PROCEDURE — 36415 COLL VENOUS BLD VENIPUNCTURE: CPT | Performed by: NURSE PRACTITIONER

## 2020-09-02 PROCEDURE — 83036 HEMOGLOBIN GLYCOSYLATED A1C: CPT | Performed by: NURSE PRACTITIONER

## 2020-09-02 PROCEDURE — 96361 HYDRATE IV INFUSION ADD-ON: CPT

## 2020-09-02 RX ORDER — CYCLOBENZAPRINE HCL 10 MG
10 TABLET ORAL 3 TIMES DAILY PRN
Status: DISCONTINUED | OUTPATIENT
Start: 2020-09-02 | End: 2020-09-03 | Stop reason: HOSPADM

## 2020-09-02 RX ORDER — ROSUVASTATIN CALCIUM 40 MG/1
40 TABLET, COATED ORAL NIGHTLY
Status: DISCONTINUED | OUTPATIENT
Start: 2020-09-02 | End: 2020-09-03 | Stop reason: HOSPADM

## 2020-09-02 RX ORDER — PREGABALIN 75 MG/1
75 CAPSULE ORAL 2 TIMES DAILY
Status: DISCONTINUED | OUTPATIENT
Start: 2020-09-02 | End: 2020-09-03 | Stop reason: HOSPADM

## 2020-09-02 RX ORDER — TRAZODONE HYDROCHLORIDE 50 MG/1
50 TABLET ORAL NIGHTLY
Status: DISCONTINUED | OUTPATIENT
Start: 2020-09-02 | End: 2020-09-03 | Stop reason: HOSPADM

## 2020-09-02 RX ORDER — TRAZODONE HYDROCHLORIDE 50 MG/1
50 TABLET ORAL NIGHTLY
COMMUNITY
End: 2020-12-11

## 2020-09-02 RX ORDER — LEVOTHYROXINE SODIUM 175 UG/1
175 TABLET ORAL DAILY
Status: DISCONTINUED | OUTPATIENT
Start: 2020-09-02 | End: 2020-09-03 | Stop reason: HOSPADM

## 2020-09-02 RX ORDER — FAMOTIDINE 20 MG/1
20 TABLET, FILM COATED ORAL
Status: DISCONTINUED | OUTPATIENT
Start: 2020-09-02 | End: 2020-09-03 | Stop reason: HOSPADM

## 2020-09-02 RX ORDER — FAMOTIDINE 20 MG/1
20 TABLET, FILM COATED ORAL 2 TIMES DAILY
COMMUNITY

## 2020-09-02 RX ORDER — FUROSEMIDE 10 MG/ML
60 INJECTION INTRAMUSCULAR; INTRAVENOUS ONCE
Status: DISCONTINUED | OUTPATIENT
Start: 2020-09-02 | End: 2020-09-02

## 2020-09-02 RX ADMIN — FAMOTIDINE 20 MG: 20 TABLET, FILM COATED ORAL at 17:10

## 2020-09-02 RX ADMIN — SODIUM CHLORIDE, PRESERVATIVE FREE 10 ML: 5 INJECTION INTRAVENOUS at 01:30

## 2020-09-02 RX ADMIN — FAMOTIDINE 20 MG: 20 TABLET, FILM COATED ORAL at 09:13

## 2020-09-02 RX ADMIN — SODIUM CHLORIDE, PRESERVATIVE FREE 10 ML: 5 INJECTION INTRAVENOUS at 09:13

## 2020-09-02 RX ADMIN — ROSUVASTATIN CALCIUM 40 MG: 40 TABLET, FILM COATED ORAL at 21:20

## 2020-09-02 RX ADMIN — SODIUM CHLORIDE 75 ML/HR: 9 INJECTION, SOLUTION INTRAVENOUS at 01:31

## 2020-09-02 RX ADMIN — RIVAROXABAN 20 MG: 20 TABLET, FILM COATED ORAL at 09:13

## 2020-09-02 RX ADMIN — SODIUM CHLORIDE, PRESERVATIVE FREE 10 ML: 5 INJECTION INTRAVENOUS at 21:20

## 2020-09-02 RX ADMIN — LEVOTHYROXINE SODIUM 175 MCG: 175 TABLET ORAL at 09:13

## 2020-09-02 RX ADMIN — PREGABALIN 75 MG: 75 CAPSULE ORAL at 09:13

## 2020-09-02 RX ADMIN — TRAZODONE HYDROCHLORIDE 50 MG: 50 TABLET ORAL at 22:13

## 2020-09-02 RX ADMIN — PREGABALIN 75 MG: 75 CAPSULE ORAL at 21:20

## 2020-09-02 NOTE — THERAPY EVALUATION
Acute Care - Speech Language Pathology   Swallow Initial Evaluation [facility]     Patient Name: Adarsh Davison  : 1965  MRN: [id number]  Today's Date: 2020               Admit Date: 2020    Visit Dx:     ICD-10-CM ICD-9-CM   1. Pulmonary nodule R91.1 793.11   2. Dyspnea, unspecified type R06.00 786.09   3. Hypotension, unspecified hypotension type I95.9 458.9   4. Lightheadedness R42 780.4     Patient Active Problem List   Diagnosis   • Dyslipidemia with high LDL and low HDL   • HTN (hypertension)   • Hyperlipidemia   • Low testosterone   • Toenail fungus   • Fissure of skin   • Abnormal weight gain   • Controlled type 2 diabetes mellitus without complication, without long-term current use of insulin (CMS/HCC)   • Multiple lung nodules on CT   • Vitamin D deficiency   • Primary hypothyroidism   • Pulmonary nodule, right   • Dyspnea   • History of pulmonary embolism   • Obesity (BMI 30-39.9)   • GERD without esophagitis     Past Medical History:   Diagnosis Date   • Arthritis    • Carpal tunnel syndrome of right wrist    • Disease of thyroid gland    • Edema    • Hypertension    • Hypothyroidism    • Labral tear of shoulder    • Neuropathy    • Pulmonary embolism (CMS/HCC)    • Type 2 diabetes mellitus (CMS/HCC)    • Vitamin D deficiency      Past Surgical History:   Procedure Laterality Date   • SHOULDER ARTHROSCOPY W/ ROTATOR CUFF REPAIR Left 10/19/2016    Procedure: LEFT SHOULDER ARTHROSCOPY WITH ROTATOR CUFF REPAIR WITH ACROMIOPLASTY, DISTAL CLAVICLE EXCISION, LABRAL DEBRIDEMENT;  Surgeon: David Allison MD;  [place] [facility] [place] [facility] [place]  Service:    • TONSILLECTOMY     • UVULECTOMY     Patient was not wearing a face mask during this therapy encounter. Therapist used appropriate personal protective equipment including mask, eye protection and gloves.  Mask used was standard procedure mask. Appropriate PPE was worn during the entire therapy session. Hand hygiene was completed before and after  therapy session. Patient is not in enhanced droplet precautions.          SWALLOW EVALUATION (last 72 hours)      SLP Adult Swallow Evaluation     Row Name 09/02/20 1500                   Rehab Evaluation    Document Type  evaluation  -MT        Subjective Information  no complaints  -MT        Patient Observations  alert;cooperative;agree to therapy  -MT        Patient Effort  good  -MT           General Information    Patient Profile Reviewed  yes  -MT        Pertinent History Of Current Problem  Recent choking episode, now fearful of eating. Just prior to SLP arriving he reported he ate a mandarin orange and orange was stuck in his throat. Very anxious. EGD on Monday.  -MT        Current Method of Nutrition  soft textures;thin liquids  -MT        Precautions/Limitations, Vision  WFL  -MT        Precautions/Limitations, Hearing  WFL  -MT        Prior Level of Function-Communication  WFL  -MT        Prior Level of Function-Swallowing  no diet consistency restrictions  -MT        Plans/Goals Discussed with  patient;agreed upon  -MT        Barriers to Rehab  none identified  -MT        Patient's Goals for Discharge  eat/drink without coughing/choking  -MT           Oral Motor and Function    Dentition Assessment  natural, present and adequate  -MT        Secretion Management  WNL/WFL  -MT        Mucosal Quality  moist, healthy  -MT           Oral Musculature and Cranial Nerve Assessment    Oral Motor General Assessment  WFL  -MT           General Eating/Swallowing Observations    Eating/Swallowing Skills  self-fed  -MT        Positioning During Eating  upright in bed  -MT        Utensils Used  spoon;cup;straw  -MT        Consistencies Trialed  soft textures;whole;pureed;thin liquids  -MT           Clinical Swallow Eval    Clinical Swallow Evaluation Summary  Patient anxious throughout eval. He reports dry mouth at times due to medications. He reports being fearful of choking and stated that mandarin orange was stuck  in his throat. He ingested ice chips, thin liquid, puree and mech soft without difficulties such as throat clearing, coughing or vocal quality change, but was observed to demonstrate grimacing and struggle, unclear how much was due to anxiety. Belching noted after drinks of water. Demonstrated animated videos of swallow to patient and had discussion regarding physiology of swallow. Patient would like to pursue VFSS to asses further.  -MT           Clinical Impression    SLP Swallowing Diagnosis  other (see comments) Rule out phayrngeal dysphagia  -MT        Functional Impact  other risk of choking  -MT        Rehab Potential/Prognosis, Swallowing  good, to achieve stated therapy goals  -MT        Swallow Criteria for Skilled Therapeutic Interventions Met  demonstrates skilled criteria  -MT           Recommendations    Therapy Frequency (Swallow)  PRN  -MT        Predicted Duration Therapy Intervention (Days)  until discharge  -MT        SLP Diet Recommendation  soft textures;whole;thin liquids  -MT        Recommended Diagnostics  VFSS (MBS)  -MT        Recommended Precautions and Strategies  upright posture during/after eating;small bites of food and sips of liquid  -MT        SLP Rec. for Method of Medication Administration  meds whole;meds crushed;with thin liquids;with pudding or applesauce;as tolerated  -MT        Monitor for Signs of Aspiration  yes;notify SLP if any concerns  -MT        Anticipated Dischage Disposition (SLP)  home  -MT           Swallow Goals (SLP)    Oral Nutrition/Hydration Goal Selection (SLP)  oral nutrition/hydration, SLP goal 1  -MT           Oral Nutrition/Hydration Goal 1 (SLP)    Oral Nutrition/Hydration Goal 1, SLP  Tolerate PO diet safely and comfortably, without anxiety or fear of choking  -MT        Time Frame (Oral Nutrition/Hydration Goal 1, SLP)  by discharge  -MT          User Key  (r) = Recorded By, (t) = Taken By, (c) = Cosigned By    Initials Name Effective Dates    MT  Pratibha Phipps MS CCC-SLP 06/08/18 -           EDUCATION  The patient has been educated in the following areas:   Dysphagia (Swallowing Impairment).    SLP Recommendation and Plan  SLP Swallowing Diagnosis: other (see comments)(Rule out pharyngeal dysphagia)  SLP Diet Recommendation: soft textures, whole, thin liquids  Recommended Precautions and Strategies: upright posture during/after eating, small bites of food and sips of liquid  SLP Rec. for Method of Medication Administration: meds whole, meds crushed, with thin liquids, with pudding or applesauce, as tolerated     Monitor for Signs of Aspiration: yes, notify SLP if any concerns  Recommended Diagnostics: VFSS [place] [place]  Swallow Criteria for Skilled Therapeutic Interventions Met: demonstrates skilled criteria  Anticipated Dischage Disposition (SLP): home  Rehab Potential/Prognosis, Swallowing: good, to achieve stated therapy goals  Therapy Frequency (Swallow): PRN  Predicted Duration Therapy Intervention (Days): until discharge       Plan of Care Reviewed With: patient  Outcome Summary: Clinical swallow evaluation completed. Patient reported sensation of food stuck in throat, also demonstrated struggle and belching, but no overt signs of aspiration such as coughing, choking, wet voice. Recommend soft diet with thin liquids, meds as best tolerated - with thin liquids or puree as patient prefers. VFSS tomorrow. Precautions: upright, chew thoroughly, small bites and sips    SLP GOALS     Row Name 09/02/20 1500             Oral Nutrition/Hydration Goal 1 (SLP)    Oral Nutrition/Hydration Goal 1, SLP  Tolerate PO diet safely and comfortably, without anxiety or fear of choking  -MT      Time Frame (Oral Nutrition/Hydration Goal 1, SLP)  by discharge  -MT        User Key  (r) = Recorded By, (t) = Taken By, (c) = Cosigned By    Initials Name Provider Type    MT Pratibha Phipps MS CCC-SLP Speech and Language Pathologist           SLP Outcome Measures (last 72 hours)       SLP Outcome Measures     Row Name 09/02/20 1600             SLP Outcome Measures    Outcome Measure Used?  Adult NOMS  -MT         Adult FCM Scores    FCM Chosen  Swallowing  -MT      Swallowing FCM Score  6  -MT        User Key  (r) = Recorded By, (t) = Taken By, (c) = Cosigned By    Initials Name Effective Dates    Pratibha Costa MS CCC-SLP 06/08/18 -            Time Calculation:   Time Calculation- SLP     Row Name 09/02/20 1601             Time Calculation- SLP    SLP Start Time  1520  -MT      SLP Stop Time  1601  -MT      SLP Time Calculation (min)  41 min  -MT      SLP Received On  09/02/20  -MT        User Key  (r) = Recorded By, (t) = Taken By, (c) = Cosigned By    Initials Name Provider Type    Pratibha Costa MS CCC-SLP Speech and Language Pathologist          Therapy Charges for Today     Code Description Service Date Service Provider Modifiers Qty    [id number] HC ST EVAL ORAL PHARYNG SWALLOW 3 9/2/2020 Pratibha Phipps MS CCC-SLP GN 1               Pratibha Phipps MS CCC-SLP  9/2/2020

## 2020-09-02 NOTE — PROGRESS NOTES
Discharge Planning Assessment  [facility]     Patient Name: Adarsh Davison  MRN: [id number]  Today's Date: 9/2/2020    Admit Date: 9/1/2020    Discharge Needs Assessment     Row Name 09/02/20 1316       Living Environment    Lives With  child(fernie), dependent    Current Living Arrangements  home/apartment/condo    Primary Care Provided by  self    Provides Primary Care For  child(fernie)    Caregiving Concerns  13 year old son and 30 year old son with autism    Family Caregiver if Needed  sibling(s)    Family Caregiver Names  Brother Ulises [phone] or sister Jailene [phone]    Quality of Family Relationships  helpful    Able to Return to Prior Arrangements  yes       Resource/Environmental Concerns    Resource/Environmental Concerns  none       Transition Planning    Patient/Family Anticipates Transition to  home with family    Patient/Family Anticipated Services at Transition  none    Transportation Anticipated  family or friend will provide       Discharge Needs Assessment    Readmission Within the Last 30 Days  no previous admission in last 30 days    Concerns to be Addressed  basic needs    Equipment Currently Used at Home  shower chair;oxygen    Anticipated Changes Related to Illness  none    Equipment Needed After Discharge  none        Discharge Plan     Row Name 09/02/20 4762       Plan    Plan  Return home with family    Patient/Family in Agreement with Plan  yes    Plan Comments  Spoke with patient at bedside.  Patient lives with his sons, 13 yr old and 30 year old with Autism.  He is IADL, uses O2 as needed from [place].  He has never used HH or been to SNF.  PCP is Dr. L Ryle in [place] and pharmacy is [facility].  Patient states his brother Ulises [phone] will transport him home.  His brother and his sister Jailene [phone] have been trying to help him some.  Patient plans to return home @ [place].  CCP will follow as needed.  BHumeniuk RN                 Demographic Summary      Row Name 09/02/20 1316       General Information    Admission Type  observation    Arrived From  home    Referral Source  admission list    Reason for Consult  discharge planning    Preferred Language  English     Used During This Interaction  no        Functional Status     Row Name 09/02/20 1316       Functional Status    Usual Activity Tolerance  moderate    Current Activity Tolerance  moderate       Functional Status, IADL    Medications  independent    Meal Preparation  independent    Housekeeping  independent    Laundry  independent    Shopping  independent       Mental Status    General Appearance WDL  WDL       Mental Status Summary    Recent Changes in Mental Status/Cognitive Functioning  no changes                Becky S. Humeniuk, RN

## 2020-09-02 NOTE — PROGRESS NOTES
Name: Adarsh Davison ADMIT: 2020   : 1965  PCP: Harrison Chin MD    MRN: [id number] LOS: 0 days   AGE/SEX: 55 y.o. male  ROOM: [facility]     Subjective   Subjective     Patient is lying in the bed in no major distress.  Denies nausea, vomiting, abdominal pain.  He does complain of some degree of shortness of breath but he is oxygenating quite well on the monitor and is not requiring any oxygen.    Objective   Objective   Vital Signs  Temp:  [97.7 °F (36.5 °C)-98.9 °F (37.2 °C)] 97.9 °F (36.6 °C)  Heart Rate:  [52-79] 65  Resp:  [18] 18  BP: ()/(54-82) 129/82  SpO2:  [93 %-97 %] 93 %  on  Flow (L/min):  [2] 2;   Device (Oxygen Therapy): room air  Body mass index is 39.17 kg/m².  Physical Exam    HEENT:  Atraumatic, normocephalic.  PERRLA.  Extraocular movements intact.  Conjunctivae pink.  Sclerae, no icterus.  Mucous membranes dry.  Oropharynx is  clear.  NECK:  Supple.  No JVD.  HEART:  Regular rate and rhythm.  Normal S1, S2.   LUNGS:  Fairly clear to auscultation anteriorly.  No wheezes.  No crackles.  ABDOMEN:   Soft, nontender.  Bowel sounds present.  No rebound.  No  guarding.  EXTREMITIES:  No cyanosis, clubbing, or edema.  Palpable pedal pulses.  NEURO:  Grossly nonfocal.  No facial asymmetry.  Good strength in all 4  extremities.  SKIN:  Warm and dry.  No evidence of rashes.  LYMPH NODES:  No palpable cervical or supraclavicular lymphadenopathy.  Results Review:       I reviewed the patient's new clinical results.  Results from last 7 days   Lab Units 20  0337 20  1623   WBC 10*3/mm3 8.86 9.60 11.10* 8.60   HEMOGLOBIN g/dL 14.8 15.5 16.6 15.5   PLATELETS 10*3/mm3 219 225 239 215     Results from last 7 days   Lab Units 20/20  0337 20  1623   SODIUM mmol/L 137 134* 137 136*   POTASSIUM mmol/L 3.8 3.7 3.8 4.2   CHLORIDE mmol/L 102 99 103 103   TOTAL CO2 mmol/L  --   --  27 26   CO2 mmol/L 26.7 26.1  --   --     BUN mg/dL 10 10 8 9   CREATININE mg/dL 0.90 1.10 0.6* 0.6*   GLUCOSE mg/dL 109* 113*  --   --    Estimated Creatinine Clearance: 129.9 mL/min (by C-G formula based on SCr of 0.9 mg/dL).  Results from last 7 days   Lab Units 09/01/20 1913 08/31/20 0337 08/29/20 1623 08/27/20  0239   ALBUMIN g/dL 4.10 4.3 4.2 4.0   BILIRUBIN mg/dL 0.9 1.1 0.9 0.7   ALK PHOS U/L 60 74 65 61   AST (SGOT) U/L 23 33 36 28   ALT (SGPT) U/L 34 41 43 42     Results from last 7 days   Lab Units 09/02/20  0624 09/01/20 1913 08/31/20 0337 08/29/20 1623 08/27/20  0239   CALCIUM mg/dL 8.8 9.7 8.7 8.9 9.3   ALBUMIN g/dL  --  4.10 4.3 4.2 4.0       COVID19   Date Value Ref Range Status   09/01/2020 Not Detected Not Detected - Ref. Range Final     Hemoglobin A1C   Date/Time Value Ref Range Status   09/02/2020 0624 6.20 (H) 4.80 - 5.60 % Final     Glucose   Date/Time Value Ref Range Status   09/02/2020 1636 136 (H) 70 - 130 mg/dL Final   09/02/2020 1128 112 70 - 130 mg/dL Final   09/02/2020 0619 102 70 - 130 mg/dL Final   09/02/2020 0107 106 70 - 130 mg/dL Final       CT Angiogram Chest  Narrative: CT ANGIOGRAM OF THE CHEST. MULTIPLE CORONAL, SAGITTAL, AND 3-D  RECONSTRUCTIONS.     HISTORY: Shortness of air intermittently for the past few weeks that has  increased recently. Midsternal chest discomfort.     TECHNIQUE: Radiation dose reduction techniques were utilized, including  automated exposure control and exposure modulation based on body size.   CT angiogram of the chest was performed following the administration of  IV contrast. Coronal, sagittal, and 3-D reconstruction images were  obtained.     COMPARISON:  No previous chest CTs for comparison.      FINDINGS: There is no intrapulmonary arterial filling defect to diagnose  a pulmonary embolus. Thoracic aorta appears within normal limits and  there is no dissection.     Within the posterior-inferior right middle lobe and abutting the  superior margin of the right major fissure there is  a 1 cm pulmonary  nodule. This nodule exhibits irregular somewhat lobular margins. There  is mild right hilar yomi enlargement. A right hilar node measures 1.2  cm. Subcarinal node measures 2.2 x 1 cm which is mildly enlarged. There  is no axillary yomi enlargement. No pleural or pericardial effusion are  evident.     Imaging through the upper abdomen appears within normal limits.     Impression: 1. 1 cm right middle lobe pulmonary nodule with irregular margins. This  could represent an inflammatory/infectious nodule though a neoplasm  could also have this appearance and this needs correlation with clinical  data and further evaluation which could be performed with a short  interval follow-up CT to evaluate if this persists or a PET/CT.  Recommend referral to [facility] pulmonary nodule [facility].  2. Mild right hilar yomi enlargement. Mildly enlarged subcarinal lymph  node.  3. No evidence for pulmonary thromboembolic disease.     Discussed with Dr. Mondragon in the emergency department on 09/01/2020 at  9:30 PM.     This report was finalized on 9/1/2020 11:55 PM by Dr. Sunday Dunham M.D.     XR Chest 1 View  ONE VIEW PORTABLE CHEST     HISTORY: Shortness of breath.     FINDINGS: The lungs are well-expanded and clear and the heart and hilar  structures are normal. There is no acute disease or change from  08/10/2016.     This report was finalized on 9/1/2020 8:06 PM by Dr. Ye Lawrence M.D.           famotidine 20 mg Oral BID AC   insulin lispro 0-9 Units Subcutaneous 4x Daily With Meals & Nightly   levothyroxine 175 mcg Oral Daily   pregabalin 75 mg Oral BID   rivaroxaban 20 mg Oral Daily   rosuvastatin 40 mg Oral Nightly   sodium chloride 10 mL Intravenous Q12H   traZODone 50 mg Oral Nightly      Diet Soft Texture; Chopped; Thin; Consistent Carbohydrate       Assessment/Plan     Active Hospital Problems    Diagnosis  POA   • **Dyspnea [R06.00]  Unknown   • History of pulmonary embolism  [Z86.711]  Unknown   • Obesity (BMI 30-39.9) [E66.9]  Unknown   • GERD without esophagitis [K21.9]  Unknown   • Pulmonary nodule, right [R91.1]  Unknown   • Primary hypothyroidism [E03.9]  Yes   • Controlled type 2 diabetes mellitus without complication, without long-term current use of insulin (CMS/[facility]) [E11.9]  Yes   • Hyperlipidemia [E78.5]  Yes   • HTN (hypertension) [I10]  Yes      Resolved Hospital Problems   No resolved problems to display.       1. Dyspnea, had extensive workup and there is no evidence of PE and echo revealed normal ejection fraction.  His feeling of shortness of breath appears to be originating from is neck from possible extrinsic compression of the airway.  CT of the neck has been ordered by Pulmonary and appreciate their recommendations.  2. Hypertension, blood pressure is borderline low therefore is not on any antihypertensives.    3. Diabetes mellitus, blood sugars are reasonably well controlled and is on corrective dose insulin.    4. Dysphagia, he recently underwent EGD with no significant findings.    5. History of PE, on Xarelto and will be continued.    6. Hypothyroidism, on Synthroid.        Celestine Fair MD  [facility]  09/02/20  17:43

## 2020-09-02 NOTE — CONSULTS
"Adult Nutrition  Assessment/PES    Patient Name:  Adarsh Davison  YOB: 1965  MRN: [id number]  Admit Date:  9/1/2020    Assessment Date:  9/2/2020    Comments:  Nutrition Consult from nursing admission database.  Pt has lost about 45 lbs in 3 months due to decline in intake from  choking episode, shoulder surgery and bilat PE's.  Had EGD yesterday and esoph dilated. Pt still scared to eat for ear of choking.  Will adjust diet texture to a soft texture and add a boost glucose control.  Pt may benefit from a Swallow eval with Speech therapy to help him feel better about swallowing appropriate textures.  Will follow as needed.    Reason for Assessment     Row Name 09/02/20 1233          Reason for Assessment    Reason For Assessment  identified at risk by screening criteria;nurse/nurse practitioner consult     Diagnosis  pulmonary disease;endocrine conditions;gastrointestinal disease Dyspnea/Pulmonary nodule, DM, EGD 2 days ago at [place]     Identified At Risk by Screening Criteria  MST SCORE 2+         Nutrition/Diet History     Row Name 09/02/20 1238          Nutrition/Diet History    Typical Food/Fluid Intake  scared to eat for fear of choking     Factors Affecting Nutritional Intake  difficulty/impaired swallowing         Anthropometrics     Row Name 09/02/20 1238          Anthropometrics    Height  182.9 cm (72\")        Admit Weight    Admit Weight  131 kg (288 lb 12.8 oz)        Ideal Body Weight (IBW)    Ideal Body Weight (IBW) (kg)  82.07     % of Ideal Body Weight Assessment  -- 159% IBW        Usual Body Weight (UBW)    Weight Loss  unintentional     Weight Loss Time Frame  44lb loss in 3 months due to illness and surgery        Body Mass Index (BMI)    BMI Assessment  BMI 35-39.9: obesity grade II BMI 39         Labs/Tests/Procedures/Meds     Row Name 09/02/20 1240          Labs/Procedures/Meds    Lab Results Reviewed  reviewed, pertinent     Lab Results Comments  [id number]        Diagnostic " Tests/Procedures    Diagnostic Test/Procedure Reviewed  reviewed, pertinent     Diagnostic Test/Procedures Comments  ct         Medications    Pertinent Medications Reviewed  reviewed, pertinent     Pertinent Medications Comments  pepcid, insulin, synthroid, xarelto, crestor, IVF         Physical Findings     Row Name 09/02/20 1241          Physical Findings    Overall Physical Appearance  obese;on oxygen therapy     Oral/Mouth Cavity  other (see comments) sore throat     Skin  other (see comments) b=19         Nutrition Prescription Ordered     Row Name 09/02/20 1242          Nutrition Prescription PO    Common Modifiers  Consistent Carbohydrate         Evaluation of Received Nutrient/Fluid Intake     Row Name 09/02/20 1242          Fluid Intake Evaluation    IV Fluid (mL)  1800        PO Evaluation    Number of Days PO Intake Evaluated  Insufficient Data         Problem/Interventions:  Problem 1     Row Name 09/02/20 1243          Nutrition Diagnoses Problem 1    Problem 1  Overweight/Obesity     Signs/Symptoms (evidenced by)  BMI     BMI  35 - 39.9         Intervention Goal     Row Name 09/02/20 1244          Intervention Goal    General  Maintain nutrition;Reduce/improve symptoms;Meet nutritional needs for age/condition;Disease management/therapy;Improved nutrition related lab(s)     PO  Tolerate PO     Weight  No significant weight loss         Nutrition Intervention     Row Name 09/02/20 1244          Nutrition Intervention    RD/Tech Action  Care plan reviewd;Follow Tx progress;Interview for preference;Advise available snack;Advise alternate selection;Recommend/ordered     Recommended/Ordered  Diet;Supplement         Nutrition Prescription     Row Name 09/02/20 1304          Nutrition Prescription PO    PO Prescription  Begin/change diet;Begin/change supplement     Begin/Change Diet to  Soft Texture     Texture  Chopped     Fluid Consistency  Thin     Supplement  Boost Glucose Control     Supplement Frequency   Daily     New PO Prescription Ordered?  Yes         Education/Evaluation     Row Name 09/02/20 1244          Education    Education  Will Instruct as appropriate        Monitor/Evaluation    Monitor  Per protocol;Skin status;Symptoms;I&O;PO intake;Pertinent labs;Weight           Electronically signed by:  Malena Nam RD  09/02/20 13:05

## 2020-09-02 NOTE — PLAN OF CARE
Problem: Patient Care Overview  Goal: Plan of Care Review  Flowsheets (Taken 9/2/2020 3772)  Plan of Care Reviewed With: patient  Outcome Summary: Clinical swallow evaluation completed. Patient reported sensation of food stuck in throat, also demonstrated struggle and belching, but no overt signs of aspiration such as coughing, choking, wet voice. Recommend soft diet with thin liquids, meds as best tolerated - with thin liquids or puree as patient prefers. VFSS tomorrow. Precautions: upright, chew thoroughly, small bites and sips

## 2020-09-02 NOTE — H&P
Patient Name:  Adarsh Davison  YOB: 1965  MRN:  [id number]  Admit Date:  9/1/2020  Patient Care Team:  Harrison Chin MD as PCP - General (Family Medicine)      Subjective   History Present Illness     Chief Complaint   Patient presents with   • Shortness of Breath     C/O SOA STARTING LAST NIGHT, PT WITH RECENT HX OF BILAT. PE'S; PT REPORTS HAVING AN EGD YESTERDAY; PT WEARING FACE MASK       Mr. Davison is a 55 y.o. non-smoker with a history of hypertension, hyperlipidemia, type 2 diabetes mellitus, and hypothyroidism that presents to [facility] complaining of shortness of breath and dizziness.  He reports he has had ongoing shortness of breath for the past 2 months.  He states the shortness of breath is worse on exertion and he reports orthopnea.  He reports a history of pulmonary embolism and states he is currently taking Xarelto.  He also reports a recent history of dysphagia and states he had an EGD performed yesterday with esophageal dilation.  He states he did not take his Xarelto for 2 days prior to the procedure and resumed taking the medication this morning.  He reports a sudden onset shortness of breath last night around 6:30 PM.  He states he called his PCP and was told to report to the ED if the symptoms did not improve overnight.  He reports mild, intermittent midsternal chest discomfort that also began last night.  He reports intermittent dizziness and light-headedness since yesterday. He denies paresthesias, visual changes, and speech disturbance.  He reports he was able to swallow his medications whole this morning without difficulty.  CT angiogram performed in the ED revealed a 1 cm right middle lobe pulmonary nodule with irregular margins, mild right hilar yomi enlargement, and mildly enlarged subcarinal lymph node.  There is no evidence for pulmonary thromboembolic disease.      History of Present Illness  Review of Systems   Constitutional: Positive for  "chills and fatigue. Negative for fever.   HENT: Positive for trouble swallowing. Negative for congestion and sore throat.    Eyes: Negative for photophobia and visual disturbance.   Respiratory: Positive for cough and shortness of breath. Negative for chest tightness.    Cardiovascular: Positive for chest pain. Negative for palpitations and leg swelling.   Gastrointestinal: Positive for abdominal pain (\"tenderness\") and nausea. Negative for diarrhea and vomiting.   Endocrine: Negative for polydipsia, polyphagia and polyuria.   Genitourinary: Negative for decreased urine volume, dysuria, flank pain, frequency and urgency.   Musculoskeletal: Negative for back pain and gait problem.   Skin: Negative for rash and wound.   Neurological: Positive for dizziness, weakness and light-headedness. Negative for facial asymmetry, speech difficulty, numbness and headaches.        Personal History     Past Medical History:   Diagnosis Date   • Arthritis    • Carpal tunnel syndrome of right wrist    • Disease of thyroid gland    • Edema    • Hypertension    • Hypothyroidism    • Labral tear of shoulder    • Neuropathy    • Pulmonary embolism (CMS/HCC)    • Type 2 diabetes mellitus (CMS/HCC)    • Vitamin D deficiency      Past Surgical History:   Procedure Laterality Date   • SHOULDER ARTHROSCOPY W/ ROTATOR CUFF REPAIR Left 10/19/2016    Procedure: LEFT SHOULDER ARTHROSCOPY WITH ROTATOR CUFF REPAIR WITH ACROMIOPLASTY, DISTAL CLAVICLE EXCISION, LABRAL DEBRIDEMENT;  Surgeon: David Allison MD;  Location: [facility];  Service:    • TONSILLECTOMY     • UVULECTOMY       Family History   Problem Relation Age of Onset   • Stroke Mother      Social History     Tobacco Use   • Smoking status: Never Smoker   • Smokeless tobacco: Never Used   Substance Use Topics   • Alcohol use: No   • Drug use: No     Medications Prior to Admission   Medication Sig Dispense Refill Last Dose   • BD HYPODERMIC NEEDLE 18G X 1\" misc USE AS DIRECTED TO " "WITHDRAW TESTOSTERONE 3 each 4 Taking   • carvedilol (COREG) 25 MG tablet TAKE ONE TABLET BY MOUTH DAILY 30 tablet 5 9/2/2020 at Unknown time   • cloNIDine (CATAPRES) 0.1 MG tablet TAKE ONE TABLET BY MOUTH TWICE A DAY 60 tablet 0 9/2/2020 at Unknown time   • Continuous Blood Gluc  (FREESTYLE SARBJIT 14 DAY READER) device USE ONE APPLICATION FOR 1 DOSE 1 Device 0    • Continuous Blood Gluc Sensor (FREESTYLE SARBJIT 14 DAY SENSOR) misc 1 application Every 14 (Fourteen) Days. 2 each 5    • Cyanocobalamin 1000 MCG/15ML liquid Take 5,000 mcg by mouth Daily.      • cyclobenzaprine (FLEXERIL) 10 MG tablet Take 1 tablet by mouth 3 (Three) Times a Day As Needed for Muscle Spasms. 12 tablet 0 Taking   • famotidine (PEPCID) 20 MG tablet Take 20 mg by mouth 2 (Two) Times a Day.      • furosemide (LASIX) 40 MG tablet Take 40 mg by mouth daily.   9/2/2020 at Unknown time   • JARDIANCE 25 MG tablet TAKE ONE TABLET BY MOUTH DAILY 30 tablet 0 9/2/2020 at Unknown time   • levothyroxine (SYNTHROID, LEVOTHROID) 175 MCG tablet TAKE ONE TABLET BY MOUTH DAILY 30 tablet 5 9/2/2020 at Unknown time   • lisinopril (PRINIVIL,ZESTRIL) 40 MG tablet TAKE ONE TABLET BY MOUTH DAILY 30 tablet 4 9/2/2020 at Unknown time   • pregabalin (Lyrica) 75 MG capsule Take 1 capsule by mouth 2 (Two) Times a Day. 60 capsule 2    • rivaroxaban (XARELTO) 10 MG tablet Take 20 mg by mouth Daily.      • rosuvastatin (CRESTOR) 40 MG tablet Take 1 tablet by mouth Every Night. 90 tablet 1 9/2/2020 at Unknown time   • Syringe/Needle, Disp, (B-D 3CC LUER-BETTY SYR 23GX1\") 23G X 1\" 3 ML misc USE AS DIRECTED FOR TESTOSTERONE INJECTION 4 each 0 Taking   • traZODone (DESYREL) 50 MG tablet Take 50 mg by mouth Every Night.      • TRULICITY 1.5 MG/0.5ML solution pen-injector INJECT 1.5 MG UNDER THE SKIN ONCE WEEKLY 4 pen 0 Past Month at Unknown time   • ibuprofen (ADVIL,MOTRIN) 600 MG tablet Take 1 tablet by mouth Every 6 (Six) Hours As Needed for Mild Pain  or Moderate Pain  " (take with food). 24 tablet 0 Taking   • Testosterone Cypionate (DEPOTESTOTERONE CYPIONATE) 200 MG/ML injection INJECT 1 ML INTRAMUSCULARLY EVERY 8 DAYS 4 mL 0 More than a month at Unknown time   • vitamin D (ERGOCALCIFEROL) 1.25 MG (22658 UT) capsule capsule TAKE ONE CAPSULE BY MOUTH TWICE WEEKLY 8 capsule 4 More than a month at Unknown time     Allergies:    Allergies   Allergen Reactions   • Cialis [Tadalafil] Other (See Comments)     BODY ACHES   • Metformin Confusion   • Lortab [Hydrocodone-Acetaminophen] Rash   • Lotrel [Amlodipine Besy-Benazepril Hcl] Rash       Objective    Objective     Vital Signs  Temp:  [97.7 °F (36.5 °C)-98.9 °F (37.2 °C)] 97.7 °F (36.5 °C)  Heart Rate:  [53-79] 59  Resp:  [18] 18  BP: ()/(54-76) 100/63  SpO2:  [93 %-97 %] 96 %  on   ;   Device (Oxygen Therapy): room air  Body mass index is 39.33 kg/m².    Physical Exam   Constitutional: He is oriented to person, place, and time. He appears well-developed and well-nourished.   HENT:   Head: Normocephalic and atraumatic.   Eyes: Conjunctivae and EOM are normal.   Neck: Normal range of motion. Neck supple.   Cardiovascular: Normal rate, regular rhythm and normal heart sounds.   No murmur heard.  Pulmonary/Chest: Effort normal and breath sounds normal.   Abdominal: Soft. Bowel sounds are normal. He exhibits no distension and no mass. There is tenderness (generalized). There is no rebound and no guarding.   Musculoskeletal: Normal range of motion. He exhibits no edema.   Neurological: He is alert and oriented to person, place, and time.   Skin: Skin is warm and dry.   Psychiatric: He has a normal mood and affect. His behavior is normal.   Nursing note and vitals reviewed.      Results Review:  I reviewed the patient's new clinical results.  I reviewed the patient's new imaging results and agree with the interpretation.  I reviewed the patient's other test results and agree with the interpretation  I personally viewed and interpreted  the patient's EKG/Telemetry data  Discussed with ED provider.    Lab Results (last 24 hours)     Procedure Component Value Units Date/Time    CBC & Differential [[id number]] Collected:  09/01/20 1913    Specimen:  Blood Updated:  09/01/20 1933    Narrative:       The following orders were created for panel order CBC & Differential.  Procedure                               Abnormality         Status                     ---------                               -----------         ------                     CBC Auto Differential[[id number]]        Abnormal            Final result                 Please view results for these tests on the individual orders.    Comprehensive Metabolic Panel [[id number]]  (Abnormal) Collected:  09/01/20 1913    Specimen:  Blood Updated:  09/01/20 2009     Glucose 113 mg/dL      BUN 10 mg/dL      Creatinine 1.10 mg/dL      Sodium 134 mmol/L      Potassium 3.7 mmol/L      Chloride 99 mmol/L      CO2 26.1 mmol/L      Calcium 9.7 mg/dL      Total Protein 7.0 g/dL      Albumin 4.10 g/dL      ALT (SGPT) 34 U/L      AST (SGOT) 23 U/L      Alkaline Phosphatase 60 U/L      Total Bilirubin 0.9 mg/dL      eGFR Non African Amer 69 mL/min/1.73      Globulin 2.9 gm/dL      A/G Ratio 1.4 g/dL      BUN/Creatinine Ratio 9.1     Anion Gap 8.9 mmol/L     Narrative:       GFR Normal >60  Chronic Kidney Disease <60  Kidney Failure <15      BNP [[id number]]  (Normal) Collected:  09/01/20 1913    Specimen:  Blood Updated:  09/01/20 2004     proBNP 92.0 pg/mL     Narrative:       Among patients with dyspnea, NT-proBNP is highly sensitive for the detection of acute congestive heart failure. In addition NT-proBNP of <300 pg/ml effectively rules out acute congestive heart failure with 99% negative predictive value.    Results may be falsely decreased if patient taking Biotin.      Troponin [[id number]]  (Normal) Collected:  09/01/20 1913    Specimen:  Blood Updated:  09/01/20 2006     Troponin T <0.010 ng/mL      Narrative:       Troponin T Reference Range:  <= 0.03 ng/mL-   Negative for AMI  >0.03 ng/mL-     Abnormal for myocardial necrosis.  Clinicians would have to utilize clinical acumen, EKG, Troponin and serial changes to determine if it is an Acute Myocardial Infarction or myocardial injury due to an underlying chronic condition.       Results may be falsely decreased if patient taking Biotin.      Protime-INR [[id number]]  (Abnormal) Collected:  09/01/20 1913    Specimen:  Blood Updated:  09/01/20 1933     Protime 15.5 Seconds      INR 1.25    CBC Auto Differential [[id number]]  (Abnormal) Collected:  09/01/20 1913    Specimen:  Blood Updated:  09/01/20 1933     WBC 9.60 10*3/mm3      RBC 5.08 10*6/mm3      Hemoglobin 15.5 g/dL      Hematocrit 44.0 %      MCV 86.6 fL      MCH 30.5 pg      MCHC 35.2 g/dL      RDW 13.9 %      RDW-SD 43.9 fl      MPV 11.5 fL      Platelets 225 10*3/mm3      Neutrophil % 63.7 %      Lymphocyte % 23.5 %      Monocyte % 10.3 %      Eosinophil % 1.7 %      Basophil % 0.3 %      Immature Grans % 0.5 %      Neutrophils, Absolute 6.11 10*3/mm3      Lymphocytes, Absolute 2.26 10*3/mm3      Monocytes, Absolute 0.99 10*3/mm3      Eosinophils, Absolute 0.16 10*3/mm3      Basophils, Absolute 0.03 10*3/mm3      Immature Grans, Absolute 0.05 10*3/mm3      nRBC 0.0 /100 WBC     Respiratory Panel PCR w/COVID-19(SARS-CoV-2) [facility] In-House, NP Swab in [facility], 3-4 HR TAT - Swab, Nasopharynx [[id number]] Collected:  09/01/20 2324    Specimen:  Swab from Nasopharynx Updated:  09/01/20 2340          Imaging Results (Last 24 Hours)     Procedure Component Value Units Date/Time    CT Angiogram Chest [[id number]] Collected:  09/01/20 2159     Updated:  09/01/20 2358    Narrative:       CT ANGIOGRAM OF THE CHEST. MULTIPLE CORONAL, SAGITTAL, AND 3-D  RECONSTRUCTIONS.     HISTORY: Shortness of air intermittently for the past few weeks that has  increased recently. Midsternal chest discomfort.        TECHNIQUE: Radiation dose reduction techniques were utilized, including  automated exposure control and exposure modulation based on body size.   CT angiogram of the chest was performed following the administration of  IV contrast. Coronal, sagittal, and 3-D reconstruction images were  obtained.     COMPARISON:  No previous chest CTs for comparison.      FINDINGS: There is no intrapulmonary arterial filling defect to diagnose  a pulmonary embolus. Thoracic aorta appears within normal limits and  there is no dissection.     Within the posterior-inferior right middle lobe and abutting the  superior margin of the right major fissure there is a 1 cm pulmonary  nodule. This nodule exhibits irregular somewhat lobular margins. There  is mild right hilar yomi enlargement. A right hilar node measures 1.2  cm. Subcarinal node measures 2.2 x 1 cm which is mildly enlarged. There  is no axillary yomi enlargement. No pleural or pericardial effusion are  evident.     Imaging through the upper abdomen appears within normal limits.       Impression:       1. 1 cm right middle lobe pulmonary nodule with irregular margins. This  could represent an inflammatory/infectious nodule though a neoplasm  could also have this appearance and this needs correlation with clinical  data and further evaluation which could be performed with a short  interval follow-up CT to evaluate if this persists or a PET/CT.  Recommend referral to [facility] pulmonary nodule [facility].  2. Mild right hilar yomi enlargement. Mildly enlarged subcarinal lymph  node.  3. No evidence for pulmonary thromboembolic disease.     Discussed with Dr. Mondragon in the emergency department on 09/01/2020 at  9:30 PM.     This report was finalized on 9/1/2020 11:55 PM by Dr. Sunday Dunham M.D.       XR Chest 1 View [[id number]] Collected:  09/01/20 1942     Updated:  09/01/20 2009    Narrative:       ONE VIEW PORTABLE CHEST     HISTORY: Shortness of  breath.     FINDINGS: The lungs are well-expanded and clear and the heart and hilar  structures are normal. There is no acute disease or change from  08/10/2016.     This report was finalized on 9/1/2020 8:06 PM by Dr. Ye Lawrence M.D.                  ECG 12 Lead   Preliminary Result   HEART RATE= 60  bpm   RR Interval= 1004  ms   SC Interval= 204  ms   P Horizontal Axis= 18  deg   P Front Axis= 0  deg   QRSD Interval= 105  ms   QT Interval= 501  ms   QRS Axis= -10  deg   T Wave Axis= 32  deg   - ABNORMAL ECG -   Sinus rhythm   Borderline prolonged SC interval   Probable left atrial enlargement   Borderline prolonged QT interval   Electronically Signed By:    Date and Time of Study: 2020-09-01 19:18:56           Assessment/Plan     Active Hospital Problems    Diagnosis POA   • **Dyspnea [R06.00] Unknown   • History of pulmonary embolism [Z86.711] Unknown   • Obesity (BMI 30-39.9) [E66.9] Unknown   • GERD without esophagitis [K21.9] Unknown   • Pulmonary nodule, right [R91.1] Unknown   • Primary hypothyroidism [E03.9] Yes   • Controlled type 2 diabetes mellitus without complication, without long-term current use of insulin (CMS/[facility]) [E11.9] Yes   • Hyperlipidemia [E78.5] Yes   • HTN (hypertension) [I10] Yes     Dyspnea/Pulmonary nodule  -Dyspnea secondary to RML pulmonary nodule?  -Oxygen saturation ok on room air  -Pulmonary consult  -RVP pending  -He reports he has been told he may have RG and will need a sleep study.  Supplemental oxygen as needed to keep sats greater than or equal to 92%  -He reports chest discomfort.  No PE on CTA, troponin ok.  Will trend troponin and monitor for now.  If symptoms persist, he may require a cardiology consult    Hypotension/Hypertension  -He is on quite a few blood pressure medications. His SBP was running in the 90s/low 100s in the ED, HR in the 50s  -Most likely cause of his dizziness  -Will hold blood pressure medications tonight  -Monitor blood pressures closely    Type  2 Diabetes Mellitus  -Hold oral diabetic medications  -Initiate correctional factor insulin  -Monitor blood sugars ACHS  -Check Hgb A1C  -NCS diet    Esophageal Dysphagia  -S/p EGD at [facility]  -He reports his symptoms have improved since his procedure yesterday  -ST consult    Hyperlipidemia/Hypothyroidism/GERD  -Continue home medications    History of Pulmonary Embolism  -Continue Xarelto    -I discussed the patients findings and my recommendations with patient and family.    VTE Prophylaxis - SCDs.  Code Status - Full code.       JUAN Shoemaker  [facility]  09/01/20  23:59 PM

## 2020-09-02 NOTE — ACP (ADVANCE CARE PLANNING)
Requested to see patient regarding Advance Directive and spiritual care needs.  Patient only wanted information on the AD.  He was uncertain what it was.  I left him an AD pamphlet and explained how it works.  He will read through it before making decision.    He has a deep Confucianist belief which is Holiness.  He goes to [employer].  He deeply appreciates prayer.

## 2020-09-02 NOTE — PLAN OF CARE
Pt. VS WNL.  No c/o pain.  Pt. A&O x4.  Pt. IVFs stopped per order.  Pt. C/o dyspnea at times, sats wnl, lungs clear.  Pt. Had speech eval today, see note, will have video swallow in a.m.  Pt. Tolerating altered diet and pills with puree, states sometimes he feels like food gets stuck.  Pt. To have Ct of the neck.  Pt. Ambulating well with stanby assist, adequate UOP.  Pt. Resting comfortably at present, will continue to monitor closely.      Problem: Patient Care Overview  Goal: Plan of Care Review  Outcome: Ongoing (interventions implemented as appropriate)  Flowsheets (Taken 9/2/2020 8938)  Progress: no change  Plan of Care Reviewed With: patient

## 2020-09-02 NOTE — ED NOTES
Pt ambulated down [place] independently. Reports feeling dizzy and weak. Pt spo2 maintained 95-96% during ambulation     Mariama Hoskins RN  09/01/20 9173

## 2020-09-02 NOTE — PLAN OF CARE
Problem: Patient Care Overview  Goal: Plan of Care Review  Outcome: Ongoing (interventions implemented as appropriate)  Flowsheets (Taken 9/2/2020 0330)  Progress: no change  Plan of Care Reviewed With: patient  Outcome Summary: A&O. VSS. RA/2L NC with sleep. SR. Voiding per urinal. No complaints of pain. Waiting to be seen by pulmonary in AM. Will continue to monitor.

## 2020-09-03 ENCOUNTER — APPOINTMENT (OUTPATIENT)
Dept: CT IMAGING | Facility: HOSPITAL | Age: 55
End: 2020-09-03

## 2020-09-03 ENCOUNTER — APPOINTMENT (OUTPATIENT)
Dept: GENERAL RADIOLOGY | Facility: HOSPITAL | Age: 55
End: 2020-09-03

## 2020-09-03 VITALS
TEMPERATURE: 97.8 F | DIASTOLIC BLOOD PRESSURE: 97 MMHG | SYSTOLIC BLOOD PRESSURE: 165 MMHG | WEIGHT: 288.8 LBS | OXYGEN SATURATION: 96 % | HEART RATE: 70 BPM | RESPIRATION RATE: 16 BRPM | BODY MASS INDEX: 39.12 KG/M2 | HEIGHT: 72 IN

## 2020-09-03 LAB
GLUCOSE BLDC GLUCOMTR-MCNC: 115 MG/DL (ref 70–130)
GLUCOSE BLDC GLUCOMTR-MCNC: 123 MG/DL (ref 70–130)

## 2020-09-03 PROCEDURE — G0378 HOSPITAL OBSERVATION PER HR: HCPCS

## 2020-09-03 PROCEDURE — 92611 MOTION FLUOROSCOPY/SWALLOW: CPT | Performed by: SPEECH-LANGUAGE PATHOLOGIST

## 2020-09-03 PROCEDURE — 25010000002 IOPAMIDOL 61 % SOLUTION: Performed by: INTERNAL MEDICINE

## 2020-09-03 PROCEDURE — 74230 X-RAY XM SWLNG FUNCJ C+: CPT

## 2020-09-03 PROCEDURE — 82962 GLUCOSE BLOOD TEST: CPT

## 2020-09-03 PROCEDURE — 70491 CT SOFT TISSUE NECK W/DYE: CPT

## 2020-09-03 RX ADMIN — RIVAROXABAN 20 MG: 20 TABLET, FILM COATED ORAL at 08:36

## 2020-09-03 RX ADMIN — BARIUM SULFATE 55 ML: 0.81 POWDER, FOR SUSPENSION ORAL at 09:29

## 2020-09-03 RX ADMIN — SODIUM CHLORIDE, PRESERVATIVE FREE 10 ML: 5 INJECTION INTRAVENOUS at 08:37

## 2020-09-03 RX ADMIN — FAMOTIDINE 20 MG: 20 TABLET, FILM COATED ORAL at 08:42

## 2020-09-03 RX ADMIN — BARIUM SULFATE 4 ML: 980 POWDER, FOR SUSPENSION ORAL at 09:30

## 2020-09-03 RX ADMIN — PREGABALIN 75 MG: 75 CAPSULE ORAL at 08:36

## 2020-09-03 RX ADMIN — LEVOTHYROXINE SODIUM 175 MCG: 175 TABLET ORAL at 08:36

## 2020-09-03 RX ADMIN — IOPAMIDOL 85 ML: 612 INJECTION, SOLUTION INTRAVENOUS at 12:39

## 2020-09-03 NOTE — PLAN OF CARE
Problem: Patient Care Overview  Goal: Plan of Care Review  Outcome: Ongoing (interventions implemented as appropriate)  Flowsheets (Taken 9/3/2020 9719)  Plan of Care Reviewed With: patient  Outcome Summary: SLP completed VFSS and VFSS f/u with patient including showing pt VFSS images. Patient demonstrated mild oropharyngeal dysphagia, please see report for detail. Recommend regular solids with thin liquids, small bites and sips, avoid straws, multiple swallows with solids and liquid wash. Recommend outpatient speech therapy for dysphagia. Meds whole with puree or thins

## 2020-09-03 NOTE — MBS/VFSS/FEES
Acute Care - Speech Language Pathology   Swallow Initial Evaluation [facility]     Patient Name: Adarsh Davison  : 1965  MRN: [id number]  Today's Date: 9/3/2020               Admit Date: 2020    Visit Dx:     ICD-10-CM ICD-9-CM   1. Pulmonary nodule R91.1 793.11   2. Dyspnea, unspecified type R06.00 786.09   3. Hypotension, unspecified hypotension type I95.9 458.9   4. Lightheadedness R42 780.4     Patient Active Problem List   Diagnosis   • Dyslipidemia with high LDL and low HDL   • HTN (hypertension)   • Hyperlipidemia   • Low testosterone   • Toenail fungus   • Fissure of skin   • Abnormal weight gain   • Controlled type 2 diabetes mellitus without complication, without long-term current use of insulin (CMS/HCC)   • Multiple lung nodules on CT   • Vitamin D deficiency   • Primary hypothyroidism   • Pulmonary nodule, right   • Dyspnea   • History of pulmonary embolism   • Obesity (BMI 30-39.9)   • GERD without esophagitis     Past Medical History:   Diagnosis Date   • Arthritis    • Carpal tunnel syndrome of right wrist    • Disease of thyroid gland    • Edema    • Hypertension    • Hypothyroidism    • Labral tear of shoulder    • Neuropathy    • Pulmonary embolism (CMS/HCC)    • Type 2 diabetes mellitus (CMS/HCC)    • Vitamin D deficiency      Past Surgical History:   Procedure Laterality Date   • SHOULDER ARTHROSCOPY W/ ROTATOR CUFF REPAIR Left 10/19/2016    Procedure: LEFT SHOULDER ARTHROSCOPY WITH ROTATOR CUFF REPAIR WITH ACROMIOPLASTY, DISTAL CLAVICLE EXCISION, LABRAL DEBRIDEMENT;  Surgeon: David Allison MD;  Location[place] [facility] [place] [facility] [place]  Service:    • TONSILLECTOMY     • UVULECTOMY          SWALLOW EVALUATION (last 72 hours)      SLP Adult Swallow Evaluation     Row Name 20 1000 20 1500                Rehab Evaluation    Document Type  evaluation  -KA  evaluation  -MT       Subjective Information  no complaints  -KA  no complaints  -MT       Patient Observations  alert;cooperative   -KA  alert;cooperative;agree to therapy  -MT       Patient Effort  excellent  -KA  good  -MT       Symptoms Noted During/After Treatment  none  -KA  --          General Information    Patient Profile Reviewed  yes  -KA  yes  -MT       Pertinent History Of Current Problem  --  Recent choking episode, now fearful of eating. Just prior to SLP arriving he reported he ate a mandarin orange and orange was stuck in his throat. Very anxious. EGD on Monday.  -MT       Current Method of Nutrition  --  soft textures;thin liquids  -MT       Precautions/Limitations, Vision  --  WFL  -MT       Precautions/Limitations, Hearing  --  WFL  -MT       Prior Level of Function-Communication  --  WFL  -MT       Prior Level of Function-Swallowing  --  no diet consistency restrictions  -MT       Plans/Goals Discussed with  --  patient;agreed upon  -MT       Barriers to Rehab  --  none identified  -MT       Patient's Goals for Discharge  --  eat/drink without coughing/choking  -MT          Oral Motor and Function    Dentition Assessment  --  natural, present and adequate  -MT       Secretion Management  --  WNL/WFL  -MT       Mucosal Quality  --  moist, healthy  -MT          Oral Musculature and Cranial Nerve Assessment    Oral Motor General Assessment  --  WFL  -MT          General Eating/Swallowing Observations    Eating/Swallowing Skills  --  self-fed  -MT       Positioning During Eating  --  upright in bed  -MT       Utensils Used  --  spoon;cup;straw  -MT       Consistencies Trialed  --  soft textures;whole;pureed;thin liquids  -MT          Clinical Swallow Eval    Clinical Swallow Evaluation Summary  --  Patient anxious throughout eval. He reports dry mouth at times due to medications. He reports being fearful of choking and stated that mandarin orange was stuck in his throat. He ingested ice chips, thin liquid, puree and mech soft without difficulties such as throat clearing, coughing or vocal quality change, but was observed to  demonstrate grimacing and struggle, unclear how much was due to anxiety. Belching noted after drinks of water. Demonstrated animated videos of swallow to patient and had discussion regarding physiology of swallow. Patient would like to pursue VFSS to asses further.  -MT          MBS/VFSS    Utensils Used  spoon;cup;straw  -KA  --       Consistencies Trialed  regular textures;soft textures;chopped;pureed;thin liquids mixed  -KA  --          MBS/VFSS Interpretation    Oral Prep Phase  WFL  -KA  --       Oral Transit Phase  impaired  -KA  --       Oral Residue  impaired  -KA  --       VFSS Summary  Patient demonstrates mild oropharyngeal dysphagia characterized by reduced BOT retraction and weakness, reduced epiglottic deflection, premature spillage and mistiming of swallow. Patient demonstrated silent penetration of thin liquids during larger repetitive sips of thins (penetration transient). Patient demonstrated trace BOT, vallacula and PPW residue from puree and trace BOT and vallacula residue from mechanical soft, pt sensed and independently performed second swallow (trace residue may contribute to patient c/o food sticking in throat). No pharyngeal residue from cracker. There was one occasion of penetration with puree from BOT residue which expelled during completion of the swallow. Esophageal scan revealed slow esophageal motility  -KA  --          Oral Transit Phase    Impaired Oral Transit Phase  piecemeal oral transit;premature spillage of liquids into pharynx  -KA  --       Piecemeal Oral Transit  regular textures;mechanical soft;pudding/puree  -KA  --       Premature Spillage of Liquids into Pharynx  thin liquids;other (see comments) juice from peaches  -KA  --          Esophageal Phase    Esophageal Phase  esophageal retention;see radiology report for further details;esophageal retention with retrograde flow through PES  -KA  --          SLP Communication to Radiology    Summary Statement  Patient  demonstrates mild oropharyngeal dysphagia characterized by reduced BOT retraction and weakness, reduced epiglottic deflection, premature spillage and mistiming of swallow. Patient demonstrated silent penetration of thin liquids during larger repetitive sips of thins (penetration transient). Patient demonstrated trace BOT, vallacula and PPW residue from puree and trace BOT and vallacula residue from mechanical soft, pt sensed and independently performed second swallow (trace residue may contribute to patient c/o food sticking in throat). No pharyngeal residue from cracker. There was one occasion of penetration with puree from BOT residue which expelled during completion of the swallow. Esophageal scan revealed slow esophageal motility  -KA  --          Clinical Impression    SLP Swallowing Diagnosis  mild;oral dysfunction;pharyngeal dysfunction  -KA  other (see comments) Rule out phayrngeal dysphagia  -MT       Functional Impact  risk of aspiration/pneumonia  -KA  other risk of choking  -MT       Rehab Potential/Prognosis, Swallowing  good, to achieve stated therapy goals  -KA  good, to achieve stated therapy goals  -MT       Swallow Criteria for Skilled Therapeutic Interventions Met  demonstrates skilled criteria  -KA  demonstrates skilled criteria  -MT          Recommendations    Therapy Frequency (Swallow)  PRN  -KA  PRN  -MT       Predicted Duration Therapy Intervention (Days)  until discharge  -KA  until discharge  -MT       SLP Diet Recommendation  regular textures;thin liquids  -KA  soft textures;whole;thin liquids  -MT       Recommended Diagnostics  --  VFSS (MBS)  -MT       Recommended Precautions and Strategies  upright posture during/after eating;small bites of food and sips of liquid;multiple swallows per bite of food;alternate between small bites of food and sips of liquid  -KA  upright posture during/after eating;small bites of food and sips of liquid  -MT       SLP Rec. for Method of Medication  Administration  meds whole;with thin liquids;with pudding or applesauce;as tolerated  -KA  meds whole;meds crushed;with thin liquids;with pudding or applesauce;as tolerated  -MT       Monitor for Signs of Aspiration  yes;notify SLP if any concerns  -KA  yes;notify SLP if any concerns  -MT       Anticipated Dischage Disposition (SLP)  home with OP services  -KA  home  -MT          Swallow Goals (SLP)    Oral Nutrition/Hydration Goal Selection (SLP)  --  oral nutrition/hydration, SLP goal 1  -MT          Oral Nutrition/Hydration Goal 1 (SLP)    Oral Nutrition/Hydration Goal 1, SLP  --  Tolerate PO diet safely and comfortably, without anxiety or fear of choking  -MT       Time Frame (Oral Nutrition/Hydration Goal 1, SLP)  --  by discharge  -MT         User Key  (r) = Recorded By, (t) = Taken By, (c) = Cosigned By    Initials Name Effective Dates    MT Pratibha Phipps, MS CCC-SLP 06/08/18 -     Denilson Paulino MA,[facility]-SLP 06/08/18 -           EDUCATION  The patient has been educated in the following areas:   Dysphagia (Swallowing Impairment).    SLP Recommendation and Plan  SLP Swallowing Diagnosis: mild, oral dysfunction, pharyngeal dysfunction  SLP Diet Recommendation: regular textures, thin liquids  Recommended Precautions and Strategies: upright posture during/after eating, small bites of food and sips of liquid, multiple swallows per bite of food, alternate between small bites of food and sips of liquid  SLP Rec. for Method of Medication Administration: meds whole, with thin liquids, with pudding or applesauce, as tolerated     Monitor for Signs of Aspiration: yes, notify SLP if any concerns     Swallow Criteria for Skilled Therapeutic Interventions Met: demonstrates skilled criteria  Anticipated Dischage Disposition (SLP): home with OP services  Rehab Potential/Prognosis, Swallowing: good, to achieve stated therapy goals  Therapy Frequency (Swallow): PRN  Predicted Duration Therapy Intervention (Days): until  discharge       Plan of Care Reviewed With: patient  Outcome Summary: SLP included VFSS and VFSS f/u with patient including showing pt VFSS images. Patient demonstrated mild oropharyngeal dysphagia, please see report for detail. Recommend regular solids with thin liquids, small bites and sips, avoid straws, multiple swallows with solids and liquid wash. Recommend outpatient speech therapy for dysphagia. Meds whole with puree or thins    SLP GOALS     Row Name 09/02/20 1500             Oral Nutrition/Hydration Goal 1 (SLP)    Oral Nutrition/Hydration Goal 1, SLP  Tolerate PO diet safely and comfortably, without anxiety or fear of choking  -MT      Time Frame (Oral Nutrition/Hydration Goal 1, SLP)  by discharge  -MT        User Key  (r) = Recorded By, (t) = Taken By, (c) = Cosigned By    Initials Name Provider Type    Pratibha Costa MS CCC-SLP Speech and Language Pathologist           SLP Outcome Measures (last 72 hours)      SLP Outcome Measures     Row Name 09/03/20 1100 09/02/20 1600          SLP Outcome Measures    Outcome Measure Used?  Adult NOMS  -KA  Adult NOMS  -MT        Adult FCM Scores    FCM Chosen  Swallowing  -KA  Swallowing  -MT     Swallowing FCM Score  6  -KA  6  -MT       User Key  (r) = Recorded By, (t) = Taken By, (c) = Cosigned By    Initials Name Effective Dates    Pratibha Costa MS CCC-SLP 06/08/18 -     Denilson Paulino MA,CCC-SLP 06/08/18 -            Time Calculation:   Time Calculation- SLP     Row Name 09/03/20 1149             Time Calculation- SLP    SLP Start Time  0845  -KA      SLP Received On  09/03/20  -KA        User Key  (r) = Recorded By, (t) = Taken By, (c) = Cosigned By    Initials Name Provider Type    Denilson Paulino MA,CCC-SLP Speech and Language Pathologist          Therapy Charges for Today     Code Description Service Date Service Provider Modifiers Qty    37632816502 HC ST MOTION FLUORO EVAL SWALLOW 7 9/3/2020 Denilson Lindquist MA,CCC-SLP GN 1                Denilson Lindquist MA,CCC-SLP  9/3/2020

## 2020-09-03 NOTE — PROGRESS NOTES
Fuad Garcia MD                          [phone]      Patient ID:    Name:  Adarsh Davison    MRN:  [id number]    1965   55 y.o.  male            Patient Care Team:  Harrison Chin MD as PCP - General (Family Medicine)    CC/ Reason for visit: Shortness of breath, suspected sleep    Subjective: Pt seen and examined this AM. No acute overnight events noted. Doing better.  CT of the neck done and showed spinal issues but no obvious structural issues with the throat which could explain her symptoms.  Noted plan for discharge.  Still having some issues with swallowing    ROS: Denies any subjective fevers, syncope or presyncopal events, new neurological deficits, nausea or vomiting currently    Objective     Vital Signs past 24hrs    BP range: BP: (127-178)/() 165/97  Pulse range: Heart Rate:  [70-81] 70  Resp rate range: Resp:  [16-18] 16  Temp range: Temp (24hrs), Av.9 °F (36.6 °C), Min:97.5 °F (36.4 °C), Max:98.4 °F (36.9 °C)      Ventilator/Non-Invasive Ventilation Settings (From admission, onward)    None          Device (Oxygen Therapy): room air       131 kg (288 lb 12.8 oz); Body mass index is 39.17 kg/m².      Intake/Output Summary (Last 24 hours) at 9/3/2020 1659  Last data filed at 9/3/2020 1401  Gross per 24 hour   Intake 2000 ml   Output 725 ml   Net 1275 ml       PHYSICAL EXAM   Constitutional: Middle aged pt in bed, No acute respiratory distress, + accessory muscle use -appears uncomfortable and is trying to clear his throat  Head: - NCAT  Eyes: No pallor, Anicteric conjunctiva, EOMI.  ENMT:  Mallampati 4, no oral thrush. Moist MM.   Occasional stridor  NECK: Trachea midline, No thyromegaly, no palpable cervical LNpathy  Heart: RRR, no murmur. No pedal edema   Lungs: LESLEE +, No wheezes/ crackles heard    Abdomen: Soft. No tenderness, guarding or rigidity. No palpable masses  Extremities: Extremities warm and well perfused. No cyanosis/  clubbing  Neuro: Conscious, answers appropriately, no gross focal neuro deficits  Psych: Mood and affect appropriate    Scheduled meds:    famotidine 20 mg Oral BID AC   insulin lispro 0-9 Units Subcutaneous 4x Daily With Meals & Nightly   levothyroxine 175 mcg Oral Daily   pregabalin 75 mg Oral BID   rivaroxaban 20 mg Oral Daily   rosuvastatin 40 mg Oral Nightly   sodium chloride 10 mL Intravenous Q12H   traZODone 50 mg Oral Nightly       IV meds:                           Data Review:      Results from last 7 days   Lab Units 09/02/20  0624 09/01/20  1913 08/31/20  0337 08/29/20  1623   SODIUM mmol/L 137 134* 137 136*   POTASSIUM mmol/L 3.8 3.7 3.8 4.2   CHLORIDE mmol/L 102 99 103 103   TOTAL CO2 mmol/L  --   --  27 26   CO2 mmol/L 26.7 26.1  --   --    BUN mg/dL 10 10 8 9   CREATININE mg/dL 0.90 1.10 0.6* 0.6*   CALCIUM mg/dL 8.8 9.7 8.7 8.9   BILIRUBIN mg/dL  --  0.9 1.1 0.9   ALK PHOS U/L  --  60 74 65   ALT (SGPT) U/L  --  34 41 43   AST (SGOT) U/L  --  23 33 36   GLUCOSE mg/dL 109* 113*  --   --    WBC 10*3/mm3 8.86 9.60 11.10* 8.60   HEMOGLOBIN g/dL 14.8 15.5 16.6 15.5   PLATELETS 10*3/mm3 219 225 239 215   INR   --  1.25*  --   --    PROBNP pg/mL  --  92.0  --   --        Lab Results   Component Value Date    CALCIUM 8.8 09/02/2020                    Results Review:    I have reviewed the relevant laboratory results and independently reviewed the chest imaging from this hospitalization including the available echocardiogram reports personally and summarized it if/ when appropriate below    Assessment    Persistent shortness of breath  RML lung nodule s/p prior bronch/ PET eval and felt neg @ [facility]   Right hilar/mediastinal lymphadenopathy -stable since [facility] CT  Recent provoked PE on anticoagulation  Suspected undiagnosed sleep apnea  Dysphagia with recent EGD requiring dilation    PLAN:  Patient stable from a respiratory standpoint.  CT of the neck done does not show any structural abnormalities  but will need to consider direct laryngoscopy evaluation for possible vocal cord dysfunction  Ongoing nodule follow-up at [place] [facility] with serial CAT scans.  Outpatient evaluation for sleep apnea  Continue with anticoagulation while on testosterone  Dysphagia evaluation per gastroenterology and speech therapy as an outpatient    Okay to discharge from my standpoint will get outpatient follow-up visit      I have discussed my findings and recommendations with patient.     Fuad Garcia MD  9/3/2020

## 2020-09-03 NOTE — DISCHARGE SUMMARY
Patient Name: Adarsh Davison  : 1965  MRN: [id number]    Date of Admission: 2020  Date of Discharge:  9/3/2020  Primary Care Physician: Harrison Chin MD      Chief Complaint:   Shortness of Breath (C/O SOA STARTING LAST NIGHT, PT WITH RECENT HX OF BILAT. PE'S; PT REPORTS HAVING AN EGD YESTERDAY; PT WEARING FACE MASK)      Discharge Diagnoses     Active Hospital Problems    Diagnosis  POA   • **Dyspnea [R06.00]  Unknown   • History of pulmonary embolism [Z86.711]  Unknown   • Obesity (BMI 30-39.9) [E66.9]  Unknown   • GERD without esophagitis [K21.9]  Unknown   • Pulmonary nodule, right [R91.1]  Unknown   • Primary hypothyroidism [E03.9]  Yes   • Controlled type 2 diabetes mellitus without complication, without long-term current use of insulin (CMS/[facility]) [E11.9]  Yes   • Hyperlipidemia [E78.5]  Yes   • HTN (hypertension) [I10]  Yes      Resolved Hospital Problems   No resolved problems to display.        Hospital Course     Mr. Davison is a 55 y.o. non-smoker with a history of hypertension, hyperlipidemia, type 2 diabetes mellitus, and hypothyroidism that presents to [facility] complaining of shortness of breath and dizziness.  He reports he has had ongoing shortness of breath for the past 2 months.  He states the shortness of breath is worse on exertion and he reports orthopnea.  He reports a history of pulmonary embolism and states he is currently taking Xarelto.  He also reports a recent history of dysphagia and states he had an EGD performed yesterday with esophageal dilation.  He states he did not take his Xarelto for 2 days prior to the procedure and resumed taking the medication this morning.  He reports a sudden onset shortness of breath last night around 6:30 PM.  He states he called his PCP and was told to report to the ED if the symptoms did not improve overnight.  He reports mild, intermittent midsternal chest discomfort that also began last night.  He reports intermittent  dizziness and light-headedness since yesterday. He denies paresthesias, visual changes, and speech disturbance.  He reports he was able to swallow his medications whole this morning without difficulty.  CT angiogram performed in the ED revealed a 1 cm right middle lobe pulmonary nodule with irregular margins, mild right hilar yomi enlargement, and mildly enlarged subcarinal lymph node.  There is no evidence for pulmonary thromboembolic disease.         1. Dyspnea, had extensive workup at [facility] and there is no evidence of PE and echo revealed normal ejection fraction.  His feeling of shortness of breath appears to be originating from is neck from possible extrinsic compression of the airway.CT of the neck was done which did not reveal any acute findings.  Most of his dyspnea is probably anxiety related and I have advised him to follow-up with his primary care provider for psychiatry referral.  Patient also benefit from a sleep study and he will follow-up with pulmonary as an outpatient basis.  He is currently not requiring any oxygen and his O2 sats are % on room air.  Able to complete sentences without any difficulty.  2. Hypertension, blood pressure is borderline low therefore is not on any antihypertensives.    3. Diabetes mellitus, blood sugars are reasonably well controlled and is on corrective dose insulin.    4. Dysphagia, he recently underwent EGD with no significant findings.    5. History of PE, on Xarelto and will be continued.    6. Hypothyroidism, on Synthroid.      Please note patient was seen examined today on day of discharge.    Day of Discharge         Physical Exam:  Temp:  [97.5 °F (36.4 °C)-98.4 °F (36.9 °C)] 97.8 °F (36.6 °C)  Heart Rate:  [70-81] 70  Resp:  [16-18] 16  BP: (127-178)/() 165/97  Body mass index is 39.17 kg/m².  Physical Exam    HEENT:  Atraumatic, normocephalic.  PERRLA.  Extraocular movements intact.  Conjunctivae pink.  Sclerae, no icterus.  Mucous membranes  dry.   NECK:  Supple.  No JVD.  HEART:  Regular rate and rhythm.  Normal S1, S2.   LUNGS:  Fairly clear to auscultation anteriorly.  No wheezes.  No crackles.  ABDOMEN:   Soft, nontender.  Bowel sounds present.  No rebound.  No  guarding.  EXTREMITIES:  No cyanosis, clubbing, or edema.  Palpable pedal pulses.  NEURO:  Grossly nonfocal.  No facial asymmetry.  Good strength in all 4  extremities.  SKIN:  Warm and dry.  No evidence of rashes.  LYMPH NODES:  No palpable cervical or supraclavicular lymphadenopathy.    Consultants     Consult Orders (all) (From admission, onward)     Start     Ordered    09/02/20 0050  Inpatient Spiritual Care Consult  Once     Provider:  (Not yet assigned)    09/02/20 0049    09/02/20 0050  Inpatient Nutrition Consult  Once     Provider:  (Not yet assigned)    09/02/20 0049    09/02/20 0035  Inpatient Consult to Advance Care Planning  Once     Provider:  (Not yet assigned)    09/02/20 0034    09/01/20 2238  Inpatient Pulmonology Consult  Once     Specialty:  Pulmonary Disease  Provider:  Ricardo Wan MD    09/01/20 2241    09/01/20 2216  [facility] (on-call MD unless specified) Details  Once     Specialty:  Hospitalist  Provider:  (Not yet assigned)    09/01/20 2215              Procedures     Imaging Results (All)     Procedure Component Value Units Date/Time    CT Soft Tissue Neck With Contrast [[id number]] Collected:  09/03/20 1345     Updated:  09/03/20 1350    Narrative:       CT SCAN OF THE NECK WITH CONTRAST 09/03/2020     CLINICAL HISTORY: Sore throat, stridor, tonsillitis or epiglottitis  suspected.     TECHNIQUE: Spiral CT images were obtained from the tops of the petrous  apices down through the sternoclavicular junction following intravenous  contrast and images were reformatted and submitted in 3 mm thick axial,  sagittal and coronal CT sections with soft tissue algorithm.     There are no prior neck CTs for comparison.     FINDINGS: The visualized portion of the ethmoid and  sphenoid sinuses are  clear.  There is minimal mucosal thickening in the inferior maxillary  sinus, otherwise the maxillary sinuses are clear.  The mastoid and  middle ear cavities are clear.  The posterior fossa structures are  normal in appearance. The nasopharynx, oropharynx, hypopharynx, true  cords and subglottic airway are normal in appearance, specifically the  epiglottis is normal in appearance with no evidence of epiglottitis on  this exam. Thyroid gland is normal in appearance.  The lung apices are  clear. The parotid, , parapharyngeal and submandibular spaces  are symmetric and are normal in appearance. No pathologic  lymphadenopathy is seen in the neck. There is some mild cervical  spondylosis and some posterior endplate spurring and mild canal  narrowing at C3-4, C4-5, C5-6 and C6-7.  There is some areas of bony  foraminal narrowing, some facet overgrowth and uncovertebral joint  hypertrophy including mild right and moderate left bony foraminal  narrowing at C3-4, moderate bilateral bony foraminal narrowing at C4-5  and mild bilateral bony foraminal narrowing at C5-6 and mild-to-moderate  left bony foraminal narrowing at C6-7.       Impression:       1. Cervical spondylosis is present with some areas of canal and  foraminal narrowing in the cervical spine as described above. The  remainder of the neck CT is normal, specifically the epiglottis is  normal with no evidence of epiglottitis and the tonsils are normal and  do not appear to be inflamed on CT. Please correlate clinically as to  the etiology of the sore throat and stridor. Results were communicated  to Celestine Fair MD, by telephone 09/03/2020 at 12:50 PM.      Radiation dose reduction techniques were utilized, including automated  exposure control and exposure modulation based on body size.     This report was finalized on 9/3/2020 1:47 PM by Dr. Jorge Alvares M.D.       FL Video Swallow With Speech Single Contrast [[id number]] Resulted:   09/03/20 0908     Updated:  09/03/20 0930    CT Angiogram Chest [[id number]] Collected:  09/01/20 2159     Updated:  09/01/20 2358    Narrative:       CT ANGIOGRAM OF THE CHEST. MULTIPLE CORONAL, SAGITTAL, AND 3-D  RECONSTRUCTIONS.     HISTORY: Shortness of air intermittently for the past few weeks that has  increased recently. Midsternal chest discomfort.     TECHNIQUE: Radiation dose reduction techniques were utilized, including  automated exposure control and exposure modulation based on body size.   CT angiogram of the chest was performed following the administration of  IV contrast. Coronal, sagittal, and 3-D reconstruction images were  obtained.     COMPARISON:  No previous chest CTs for comparison.      FINDINGS: There is no intrapulmonary arterial filling defect to diagnose  a pulmonary embolus. Thoracic aorta appears within normal limits and  there is no dissection.     Within the posterior-inferior right middle lobe and abutting the  superior margin of the right major fissure there is a 1 cm pulmonary  nodule. This nodule exhibits irregular somewhat lobular margins. There  is mild right hilar yomi enlargement. A right hilar node measures 1.2  cm. Subcarinal node measures 2.2 x 1 cm which is mildly enlarged. There  is no axillary yomi enlargement. No pleural or pericardial effusion are  evident.     Imaging through the upper abdomen appears within normal limits.       Impression:       1. 1 cm right middle lobe pulmonary nodule with irregular margins. This  could represent an inflammatory/infectious nodule though a neoplasm  could also have this appearance and this needs correlation with clinical  data and further evaluation which could be performed with a short  interval follow-up CT to evaluate if this persists or a PET/CT.  Recommend referral to [facility].  2. Mild right hilar yomi enlargement. Mildly enlarged subcarinal lymph  node.  3. No evidence for  pulmonary thromboembolic disease.     Discussed with Dr. Mondragon in the emergency department on 09/01/2020 at  9:30 PM.     This report was finalized on 9/1/2020 11:55 PM by Dr. Sunday Dunham M.D.       XR Chest 1 View [[id number]] Collected:  09/01/20 1942     Updated:  09/01/20 2009    Narrative:       ONE VIEW PORTABLE CHEST     HISTORY: Shortness of breath.     FINDINGS: The lungs are well-expanded and clear and the heart and hilar  structures are normal. There is no acute disease or change from  08/10/2016.     This report was finalized on 9/1/2020 8:06 PM by Dr. Ye Lawrence M.D.             Pertinent Labs     Results from last 7 days   Lab Units 09/02/20 0624 09/01/20 1913 08/31/20 0337 08/29/20  1623   WBC 10*3/mm3 8.86 9.60 11.10* 8.60   HEMOGLOBIN g/dL 14.8 15.5 16.6 15.5   PLATELETS 10*3/mm3 219 225 239 215     Results from last 7 days   Lab Units 09/02/20 0624 09/01/20 1913 08/31/20 0337 08/29/20  1623   SODIUM mmol/L 137 134* 137 136*   POTASSIUM mmol/L 3.8 3.7 3.8 4.2   CHLORIDE mmol/L 102 99 103 103   TOTAL CO2 mmol/L  --   --  27 26   CO2 mmol/L 26.7 26.1  --   --    BUN mg/dL 10 10 8 9   CREATININE mg/dL 0.90 1.10 0.6* 0.6*   GLUCOSE mg/dL 109* 113*  --   --    Estimated Creatinine Clearance: 129.9 mL/min (by C-G formula based on SCr of 0.9 mg/dL).  Results from last 7 days   Lab Units 09/01/20 1913 08/31/20 0337 08/29/20  1623   ALBUMIN g/dL 4.10 4.3 4.2   BILIRUBIN mg/dL 0.9 1.1 0.9   ALK PHOS U/L 60 74 65   AST (SGOT) U/L 23 33 36   ALT (SGPT) U/L 34 41 43     Results from last 7 days   Lab Units 09/02/20 0624 09/01/20 1913 08/31/20 0337 08/29/20  1623   CALCIUM mg/dL 8.8 9.7 8.7 8.9   ALBUMIN g/dL  --  4.10 4.3 4.2     Results from last 7 days   Lab Units 08/31/20  0327 08/30/20  0120   AMYLASE U/L 54 52   LIPASE U/L 84 64     Results from last 7 days   Lab Units 09/02/20  1037 09/02/20  0624 09/02/20  0056 09/01/20  1913   TROPONIN T ng/mL <0.010 <0.010 <0.010 <0.010   PROBNP pg/mL  " --   --   --  92.0           Invalid input(s): LDLCALC        Test Results Pending at Discharge       Discharge Details        Discharge Medications      Continue These Medications      Instructions Start Date   BD Hypodermic Needle 18G X 1\" misc  Generic drug:  Needle (Disp)   USE AS DIRECTED TO WITHDRAW TESTOSTERONE      carvedilol 25 MG tablet  Commonly known as:  COREG   TAKE ONE TABLET BY MOUTH DAILY      cloNIDine 0.1 MG tablet  Commonly known as:  CATAPRES   TAKE ONE TABLET BY MOUTH TWICE A DAY      Cyanocobalamin 1000 MCG/15ML liquid   5,000 mcg, Oral, Daily      cyclobenzaprine 10 MG tablet  Commonly known as:  FLEXERIL   10 mg, Oral, 3 Times Daily PRN      famotidine 20 MG tablet  Commonly known as:  PEPCID   20 mg, Oral, 2 Times Daily      FreeStyle Alejo 14 Day [place] device   USE ONE APPLICATION FOR 1 DOSE      FreeStyle Alejo 14 Day Sensor misc   1 application, Does not apply, Every 14 Days      furosemide 40 MG tablet  Commonly known as:  LASIX   40 mg, Oral, Daily      ibuprofen 600 MG tablet  Commonly known as:  ADVIL,MOTRIN   600 mg, Oral, Every 6 Hours PRN      Jardiance 25 MG tablet  Generic drug:  Empagliflozin   TAKE ONE TABLET BY MOUTH DAILY      levothyroxine 175 MCG tablet  Commonly known as:  SYNTHROID, LEVOTHROID   TAKE ONE TABLET BY MOUTH DAILY      lisinopril 40 MG tablet  Commonly known as:  PRINIVIL,ZESTRIL   TAKE ONE TABLET BY MOUTH DAILY      pregabalin 75 MG capsule  Commonly known as:  Lyrica   75 mg, Oral, 2 Times Daily      rivaroxaban 10 MG tablet  Commonly known as:  XARELTO   20 mg, Oral, Daily      rosuvastatin 40 MG tablet  Commonly known as:  CRESTOR   40 mg, Oral, Nightly      Syringe/Needle (Disp) 23G X 1\" 3 ML misc  Commonly known as:  B-D 3CC LUER-BETTY SYR 23GX1\"   USE AS DIRECTED FOR TESTOSTERONE INJECTION      Testosterone Cypionate 200 MG/ML injection  Commonly known as:  DEPOTESTOTERONE CYPIONATE   INJECT 1 ML INTRAMUSCULARLY EVERY 8 DAYS      traZODone 50 MG " tablet  Commonly known as:  DESYREL   50 mg, Oral, Nightly      Trulicity 1.5 MG/0.5ML solution pen-injector  Generic drug:  Dulaglutide   INJECT 1.5 MG UNDER THE SKIN ONCE WEEKLY      vitamin D 1.25 MG (71747 UT) capsule capsule  Commonly known as:  ERGOCALCIFEROL   TAKE ONE CAPSULE BY MOUTH TWICE WEEKLY             Allergies   Allergen Reactions   • Cialis [Tadalafil] Other (See Comments)     BODY ACHES   • Metformin Confusion   • Lortab [Hydrocodone-Acetaminophen] Rash   • Lotrel [Amlodipine Besy-Benazepril Hcl] Rash         Discharge Disposition:  Home or Self Care    Discharge Diet:  Diet Order   Procedures   • Diet Regular; Thin; Consistent Carbohydrate       Discharge Activity:   Activity Instructions     Activity as Tolerated            CODE STATUS:    Code Status and Medical Interventions:   Ordered at: 09/01/20 2241     Code Status:    CPR     Medical Interventions (Level of Support Prior to Arrest):    Full       Future Appointments   Date Time Provider Department Center   12/4/2020 11:30 AM [facility] None   12/11/2020 11:45 AM Belia Weir APRN [facility] None     Additional Instructions for the Follow-ups that You Need to Schedule     Discharge Follow-up with PCP   As directed       Currently Documented PCP:    Harrison Chin MD    PCP Phone Number:    [phone]     Follow Up Details:  2 weeks           Follow-up Information     Harrison Chin MD .    Specialty:  Family Medicine  Why:  2 weeks  Contact information:  [address]  [facility] [address] [phone]             Fuad Garcia MD Follow up in 2 week(s).    Specialty:  Pulmonary Disease  Why:  please call as soon as possible to schedule follow up appointment  Contact information:  [address]   [facility] [address] [phone]                   Additional Instructions for the Follow-ups that You Need to Schedule     Discharge Follow-up with PCP   As directed        Currently Documented PCP:    Harrison Chin MD    PCP Phone Number:    [phone]     Follow Up Details:  2 weeks           Time Spent on Discharge:  Greater than 30 minutes      Celestine Fair MD  [facility]  09/03/20  5:38 PM

## 2020-09-03 NOTE — MBS/VFSS/FEES
Acute Care - Speech Language Pathology   Swallow Initial Evaluation [facility]     Patient Name: Adarsh Davison  : 1965  MRN: [id number]  Today's Date: 9/3/2020               Admit Date: 2020    Visit Dx:     ICD-10-CM ICD-9-CM   1. Pulmonary nodule R91.1 793.11   2. Dyspnea, unspecified type R06.00 786.09   3. Hypotension, unspecified hypotension type I95.9 458.9   4. Lightheadedness R42 780.4     Patient Active Problem List   Diagnosis   • Dyslipidemia with high LDL and low HDL   • HTN (hypertension)   • Hyperlipidemia   • Low testosterone   • Toenail fungus   • Fissure of skin   • Abnormal weight gain   • Controlled type 2 diabetes mellitus without complication, without long-term current use of insulin (CMS/HCC)   • Multiple lung nodules on CT   • Vitamin D deficiency   • Primary hypothyroidism   • Pulmonary nodule, right   • Dyspnea   • History of pulmonary embolism   • Obesity (BMI 30-39.9)   • GERD without esophagitis     Past Medical History:   Diagnosis Date   • Arthritis    • Carpal tunnel syndrome of right wrist    • Disease of thyroid gland    • Edema    • Hypertension    • Hypothyroidism    • Labral tear of shoulder    • Neuropathy    • Pulmonary embolism (CMS/HCC)    • Type 2 diabetes mellitus (CMS/HCC)    • Vitamin D deficiency      Past Surgical History:   Procedure Laterality Date   • SHOULDER ARTHROSCOPY W/ ROTATOR CUFF REPAIR Left 10/19/2016    Procedure: LEFT SHOULDER ARTHROSCOPY WITH ROTATOR CUFF REPAIR WITH ACROMIOPLASTY, DISTAL CLAVICLE EXCISION, LABRAL DEBRIDEMENT;  Surgeon: David Allison MD;  Location: [facility] [place] [facility] [place];  Service:    • TONSILLECTOMY     • UVULECTOMY          SWALLOW EVALUATION (last 72 hours)      SLP Adult Swallow Evaluation     Row Name 20 1000 20 1500                Rehab Evaluation    Document Type  evaluation  -KA  evaluation  -MT       Subjective Information  no complaints  -KA  no complaints  -MT       Patient Observations  alert;cooperative   -KA  alert;cooperative;agree to therapy  -MT       Patient Effort  excellent  -KA  good  -MT       Symptoms Noted During/After Treatment  none  -KA  --          General Information    Patient Profile Reviewed  yes  -KA  yes  -MT       Pertinent History Of Current Problem  --  Recent choking episode, now fearful of eating. Just prior to SLP arriving he reported he ate a mandarin orange and orange was stuck in his throat. Very anxious. EGD on Monday.  -MT       Current Method of Nutrition  --  soft textures;thin liquids  -MT       Precautions/Limitations, Vision  --  WFL  -MT       Precautions/Limitations, Hearing  --  WFL  -MT       Prior Level of Function-Communication  --  WFL  -MT       Prior Level of Function-Swallowing  --  no diet consistency restrictions  -MT       Plans/Goals Discussed with  --  patient;agreed upon  -MT       Barriers to Rehab  --  none identified  -MT       Patient's Goals for Discharge  --  eat/drink without coughing/choking  -MT          Oral Motor and Function    Dentition Assessment  --  natural, present and adequate  -MT       Secretion Management  --  WNL/WFL  -MT       Mucosal Quality  --  moist, healthy  -MT          Oral Musculature and Cranial Nerve Assessment    Oral Motor General Assessment  --  WFL  -MT          General Eating/Swallowing Observations    Eating/Swallowing Skills  --  self-fed  -MT       Positioning During Eating  --  upright in bed  -MT       Utensils Used  --  spoon;cup;straw  -MT       Consistencies Trialed  --  soft textures;whole;pureed;thin liquids  -MT          Clinical Swallow Eval    Clinical Swallow Evaluation Summary  --  Patient anxious throughout eval. He reports dry mouth at times due to medications. He reports being fearful of choking and stated that mandarin orange was stuck in his throat. He ingested ice chips, thin liquid, puree and mech soft without difficulties such as throat clearing, coughing or vocal quality change, but was observed to  demonstrate grimacing and struggle, unclear how much was due to anxiety. Belching noted after drinks of water. Demonstrated animated videos of swallow to patient and had discussion regarding physiology of swallow. Patient would like to pursue VFSS to asses further.  -MT          MBS/VFSS    Utensils Used  spoon;cup;straw  -KA  --       Consistencies Trialed  regular textures;soft textures;chopped;pureed;thin liquids mixed  -KA  --          MBS/VFSS Interpretation    Oral Prep Phase  WFL  -KA  --       Oral Transit Phase  impaired  -KA  --       Oral Residue  impaired  -KA  --       VFSS Summary  Patient demonstrates mild oropharyngeal dysphagia characterized by reduced BOT retraction and weakness, reduced epiglottic deflection, premature spillage and mistiming of swallow. Patient demonstrated silent penetration of thin liquids during larger repetitive sips of thins (penetration transient). Patient demonstrated trace BOT, vallacula and PPW residue from puree and trace BOT and vallacula residue from mechanical soft, pt sensed and independently performed second swallow (trace residue may contribute to patient c/o food sticking in throat). No pharyngeal residue from cracker. There was one occasion of penetration with puree from BOT residue which expelled during completion of the swallow. Esophageal scan revealed slow esophageal motility  -KA  --          Oral Transit Phase    Impaired Oral Transit Phase  piecemeal oral transit;premature spillage of liquids into pharynx  -KA  --       Piecemeal Oral Transit  regular textures;mechanical soft;pudding/puree  -KA  --       Premature Spillage of Liquids into Pharynx  thin liquids;other (see comments) juice from peaches  -KA  --          Esophageal Phase    Esophageal Phase  esophageal retention;see radiology report for further details;esophageal retention with retrograde flow through PES  -KA  --          SLP Communication to Radiology    Summary Statement  Patient  demonstrates mild oropharyngeal dysphagia characterized by reduced BOT retraction and weakness, reduced epiglottic deflection, premature spillage and mistiming of swallow. Patient demonstrated silent penetration of thin liquids during larger repetitive sips of thins (penetration transient). Patient demonstrated trace BOT, vallacula and PPW residue from puree and trace BOT and vallacula residue from mechanical soft, pt sensed and independently performed second swallow (trace residue may contribute to patient c/o food sticking in throat). No pharyngeal residue from cracker. There was one occasion of penetration with puree from BOT residue which expelled during completion of the swallow. Esophageal scan revealed slow esophageal motility  -KA  --          Clinical Impression    SLP Swallowing Diagnosis  mild;oral dysfunction;pharyngeal dysfunction  -KA  other (see comments) Rule out phayrngeal dysphagia  -MT       Functional Impact  risk of aspiration/pneumonia  -KA  other risk of choking  -MT       Rehab Potential/Prognosis, Swallowing  good, to achieve stated therapy goals  -KA  good, to achieve stated therapy goals  -MT       Swallow Criteria for Skilled Therapeutic Interventions Met  demonstrates skilled criteria  -KA  demonstrates skilled criteria  -MT          Recommendations    Therapy Frequency (Swallow)  PRN  -KA  PRN  -MT       Predicted Duration Therapy Intervention (Days)  until discharge  -KA  until discharge  -MT       SLP Diet Recommendation  regular textures;thin liquids  -KA  soft textures;whole;thin liquids  -MT       Recommended Diagnostics  --  VFSS (MBS)  -MT       Recommended Precautions and Strategies  upright posture during/after eating;small bites of food and sips of liquid;multiple swallows per bite of food;alternate between small bites of food and sips of liquid  -KA  upright posture during/after eating;small bites of food and sips of liquid  -MT       SLP Rec. for Method of Medication  Administration  meds whole;with thin liquids;with pudding or applesauce;as tolerated  -KA  meds whole;meds crushed;with thin liquids;with pudding or applesauce;as tolerated  -MT       Monitor for Signs of Aspiration  yes;notify SLP if any concerns  -KA  yes;notify SLP if any concerns  -MT       Anticipated Dischage Disposition (SLP)  home with OP services  -KA  home  -MT          Swallow Goals (SLP)    Oral Nutrition/Hydration Goal Selection (SLP)  --  oral nutrition/hydration, SLP goal 1  -MT          Oral Nutrition/Hydration Goal 1 (SLP)    Oral Nutrition/Hydration Goal 1, SLP  --  Tolerate PO diet safely and comfortably, without anxiety or fear of choking  -MT       Time Frame (Oral Nutrition/Hydration Goal 1, SLP)  --  by discharge  -MT         User Key  (r) = Recorded By, (t) = Taken By, (c) = Cosigned By    Initials Name Effective Dates    MT Pratibha Phipps, MS CCC-[facility] 06/08/18 -     Denilson Paulino MA,[facility] 06/08/18 -        Patient was not wearing a face mask during this therapy encounter. Therapist used appropriate personal protective equipment including mask, eye protection and gloves.  Mask used was standard procedure mask. Appropriate PPE was worn during the entire therapy session. Hand hygiene was completed before and after therapy session. Patient is not in enhanced droplet precautions.     EDUCATION  The patient has been educated in the following areas:   Dysphagia (Swallowing Impairment).    SLP Recommendation and Plan  SLP Swallowing Diagnosis: mild, oral dysfunction, pharyngeal dysfunction  SLP Diet Recommendation: regular textures, thin liquids  Recommended Precautions and Strategies: upright posture during/after eating, small bites of food and sips of liquid, multiple swallows per bite of food, alternate between small bites of food and sips of liquid  SLP Rec. for Method of Medication Administration: meds whole, with thin liquids, with pudding or applesauce, as tolerated     Monitor for  Signs of Aspiration: yes, notify SLP if any concerns     Swallow Criteria for Skilled Therapeutic Interventions Met: demonstrates skilled criteria  Anticipated Dischage Disposition (SLP): home with OP services  Rehab Potential/Prognosis, Swallowing: good, to achieve stated therapy goals  Therapy Frequency (Swallow): PRN  Predicted Duration Therapy Intervention (Days): until discharge       Plan of Care Reviewed With: patient  Outcome Summary: SLP included VFSS and VFSS f/u with patient including showing pt VFSS images. Patient demonstrated mild oropharyngeal dysphagia, please see report for detail. Recommend regular solids with thin liquids, small bites and sips, avoid straws, multiple swallows with solids and liquid wash. Recommend outpatient speech therapy for dysphagia. Meds whole with puree or thins    SLP GOALS     Row Name 09/02/20 1500             Oral Nutrition/Hydration Goal 1 (SLP)    Oral Nutrition/Hydration Goal 1, SLP  Tolerate PO diet safely and comfortably, without anxiety or fear of choking  -MT      Time Frame (Oral Nutrition/Hydration Goal 1, SLP)  by discharge  -MT        User Key  (r) = Recorded By, (t) = Taken By, (c) = Cosigned By    Initials Name Provider Type    Pratibha Costa MS CCC-SLP Speech and Language Pathologist           SLP Outcome Measures (last 72 hours)      SLP Outcome Measures     Row Name 09/03/20 1100 09/02/20 1600          SLP Outcome Measures    Outcome Measure Used?  Adult NOMS  -KA  Adult NOMS  -MT        Adult FCM Scores    FCM Chosen  Swallowing  -KA  Swallowing  -MT     Swallowing FCM Score  6  -KA  6  -MT       User Key  (r) = Recorded By, (t) = Taken By, (c) = Cosigned By    Initials Name Effective Dates    Pratibha Costa MS CCC-SLP 06/08/18 -     [facility] 06/08/18 -            Time Calculation:   Time Calculation- SLP     Row Name 09/03/20 1149             Time Calculation- SLP    SLP Start Time  0845  -KA      SLP Received On  09/03/20   -SYDNIE        User Key  (r) = Recorded By, (t) = Taken By, (c) = Cosigned By    Initials Name Provider Type    Denilson Paulino MA,CCC-SLP Speech and Language Pathologist          Therapy Charges for Today     Code Description Service Date Service Provider Modifiers Qty    [id number]  ST MOTION FLUORO EVAL SWALLOW 7 9/3/2020 Denilson Lindquist MA,CCC-SLP GN 1               Denilson Lindquist MA,CCC-SLP  9/3/2020

## 2020-09-03 NOTE — PLAN OF CARE
Problem: Patient Care Overview  Goal: Plan of Care Review  Outcome: Ongoing (interventions implemented as appropriate)  Flowsheets (Taken 9/3/2020 8678)  Progress: improving  Outcome Summary: A&O. VSS. RA. SB/SR. Pt tolerating soft diet with thin liquids, meds with pudding followed by liquid. Up with standby/ad vinayak in room. No complaints of pain/SOB. Will continue to monitor.

## 2020-09-04 NOTE — PROGRESS NOTES
Continued Stay Note  [facility]     Patient Name: Adarsh Davison  MRN: [id number]  Today's Date: 9/4/2020    Admit Date: 9/1/2020    Discharge Plan     Row Name 09/04/20 0751       Plan    Final Discharge Disposition Code  01 - home or self-care    Final Note  DC'd home. Constance Good RN        Discharge Codes    No documentation.       Expected Discharge Date and Time     Expected Discharge Date Expected Discharge Time    Sep 3, 2020             Constance Good RN

## 2020-09-06 DIAGNOSIS — E03.9 HYPOTHYROIDISM, UNSPECIFIED TYPE: ICD-10-CM

## 2020-09-06 DIAGNOSIS — I10 ESSENTIAL HYPERTENSION: ICD-10-CM

## 2020-09-06 DIAGNOSIS — R63.5 ABNORMAL WEIGHT GAIN: ICD-10-CM

## 2020-09-06 DIAGNOSIS — E11.9 CONTROLLED TYPE 2 DIABETES MELLITUS WITHOUT COMPLICATION, WITHOUT LONG-TERM CURRENT USE OF INSULIN (HCC): ICD-10-CM

## 2020-09-06 DIAGNOSIS — R79.89 LOW TESTOSTERONE: ICD-10-CM

## 2020-09-06 DIAGNOSIS — E78.5 DYSLIPIDEMIA WITH HIGH LDL AND LOW HDL: ICD-10-CM

## 2020-09-06 DIAGNOSIS — E78.5 HYPERLIPIDEMIA, UNSPECIFIED HYPERLIPIDEMIA TYPE: ICD-10-CM

## 2020-09-08 RX ORDER — CLONIDINE HYDROCHLORIDE 0.1 MG/1
TABLET ORAL
Qty: 60 TABLET | Refills: 2 | Status: SHIPPED | OUTPATIENT
Start: 2020-09-08 | End: 2020-12-06

## 2020-09-08 RX ORDER — EMPAGLIFLOZIN 25 MG/1
TABLET, FILM COATED ORAL
Qty: 30 TABLET | Refills: 2 | Status: SHIPPED | OUTPATIENT
Start: 2020-09-08 | End: 2021-02-01

## 2020-09-29 DIAGNOSIS — E78.5 HYPERLIPIDEMIA, UNSPECIFIED HYPERLIPIDEMIA TYPE: ICD-10-CM

## 2020-09-29 DIAGNOSIS — E03.9 HYPOTHYROIDISM, UNSPECIFIED TYPE: ICD-10-CM

## 2020-09-29 DIAGNOSIS — R63.5 ABNORMAL WEIGHT GAIN: ICD-10-CM

## 2020-09-29 DIAGNOSIS — E11.9 CONTROLLED TYPE 2 DIABETES MELLITUS WITHOUT COMPLICATION, WITHOUT LONG-TERM CURRENT USE OF INSULIN (HCC): ICD-10-CM

## 2020-09-29 DIAGNOSIS — I10 ESSENTIAL HYPERTENSION: ICD-10-CM

## 2020-09-29 DIAGNOSIS — E78.5 DYSLIPIDEMIA WITH HIGH LDL AND LOW HDL: ICD-10-CM

## 2020-09-29 DIAGNOSIS — R79.89 LOW TESTOSTERONE: ICD-10-CM

## 2020-09-29 RX ORDER — LISINOPRIL 40 MG/1
TABLET ORAL
Qty: 30 TABLET | Refills: 3 | Status: SHIPPED | OUTPATIENT
Start: 2020-09-29 | End: 2021-02-03

## 2020-12-04 DIAGNOSIS — E78.2 MIXED HYPERLIPIDEMIA: ICD-10-CM

## 2020-12-04 DIAGNOSIS — E03.9 PRIMARY HYPOTHYROIDISM: ICD-10-CM

## 2020-12-04 DIAGNOSIS — E11.9 CONTROLLED TYPE 2 DIABETES MELLITUS WITHOUT COMPLICATION, WITHOUT LONG-TERM CURRENT USE OF INSULIN (HCC): ICD-10-CM

## 2020-12-04 DIAGNOSIS — I10 ESSENTIAL HYPERTENSION: Primary | ICD-10-CM

## 2020-12-04 DIAGNOSIS — R79.89 LOW TESTOSTERONE: ICD-10-CM

## 2020-12-04 DIAGNOSIS — E55.9 VITAMIN D DEFICIENCY: ICD-10-CM

## 2020-12-04 DIAGNOSIS — I10 ESSENTIAL HYPERTENSION: ICD-10-CM

## 2020-12-05 DIAGNOSIS — R79.89 LOW TESTOSTERONE: ICD-10-CM

## 2020-12-05 DIAGNOSIS — I10 ESSENTIAL HYPERTENSION: ICD-10-CM

## 2020-12-05 DIAGNOSIS — E78.5 DYSLIPIDEMIA WITH HIGH LDL AND LOW HDL: ICD-10-CM

## 2020-12-05 DIAGNOSIS — E78.5 HYPERLIPIDEMIA, UNSPECIFIED HYPERLIPIDEMIA TYPE: ICD-10-CM

## 2020-12-05 DIAGNOSIS — E03.9 HYPOTHYROIDISM, UNSPECIFIED TYPE: ICD-10-CM

## 2020-12-05 DIAGNOSIS — R63.5 ABNORMAL WEIGHT GAIN: ICD-10-CM

## 2020-12-05 DIAGNOSIS — E11.9 CONTROLLED TYPE 2 DIABETES MELLITUS WITHOUT COMPLICATION, WITHOUT LONG-TERM CURRENT USE OF INSULIN (HCC): ICD-10-CM

## 2020-12-06 RX ORDER — CLONIDINE HYDROCHLORIDE 0.1 MG/1
TABLET ORAL
Qty: 60 TABLET | Refills: 1 | Status: SHIPPED | OUTPATIENT
Start: 2020-12-06 | End: 2021-02-03

## 2020-12-11 ENCOUNTER — OFFICE VISIT (OUTPATIENT)
Dept: ENDOCRINOLOGY | Age: 55
End: 2020-12-11

## 2020-12-11 VITALS
DIASTOLIC BLOOD PRESSURE: 70 MMHG | SYSTOLIC BLOOD PRESSURE: 128 MMHG | HEIGHT: 72 IN | WEIGHT: 315 LBS | BODY MASS INDEX: 42.66 KG/M2

## 2020-12-11 DIAGNOSIS — E78.5 DYSLIPIDEMIA WITH HIGH LDL AND LOW HDL: ICD-10-CM

## 2020-12-11 DIAGNOSIS — E03.9 PRIMARY HYPOTHYROIDISM: ICD-10-CM

## 2020-12-11 DIAGNOSIS — R79.89 LOW TESTOSTERONE IN MALE: ICD-10-CM

## 2020-12-11 DIAGNOSIS — E11.9 CONTROLLED TYPE 2 DIABETES MELLITUS WITHOUT COMPLICATION, WITHOUT LONG-TERM CURRENT USE OF INSULIN (HCC): Primary | ICD-10-CM

## 2020-12-11 DIAGNOSIS — R79.89 LOW TESTOSTERONE: ICD-10-CM

## 2020-12-11 DIAGNOSIS — E55.9 VITAMIN D DEFICIENCY: ICD-10-CM

## 2020-12-11 DIAGNOSIS — I10 ESSENTIAL HYPERTENSION: ICD-10-CM

## 2020-12-11 PROCEDURE — 99214 OFFICE O/P EST MOD 30 MIN: CPT | Performed by: NURSE PRACTITIONER

## 2020-12-11 RX ORDER — TESTOSTERONE CYPIONATE 200 MG/ML
INJECTION, SOLUTION INTRAMUSCULAR
Qty: 4 ML | Refills: 0 | Status: SHIPPED | OUTPATIENT
Start: 2020-12-11 | End: 2021-02-02 | Stop reason: SDUPTHER

## 2020-12-11 RX ORDER — GABAPENTIN 100 MG/1
100 CAPSULE ORAL 3 TIMES DAILY
Qty: 270 CAPSULE | Refills: 0 | Status: SHIPPED | OUTPATIENT
Start: 2020-12-11 | End: 2021-03-17

## 2020-12-11 RX ORDER — ICOSAPENT ETHYL 500 MG/1
4 CAPSULE ORAL 2 TIMES DAILY
Qty: 240 CAPSULE | Refills: 5 | Status: SHIPPED | OUTPATIENT
Start: 2020-12-11

## 2020-12-11 NOTE — PROGRESS NOTES
"  Subjective    Adarsh Davison is a 55 y.o. male. he is here today for follow-up.  Chief Complaint   Patient presents with   • Diabetes     type 2 diabetes, doesnt check very often, due for eye exam     /70   Ht 182.9 cm (72\")   Wt (!) 146 kg (320 lb 12.8 oz)   BMI 43.51 kg/m²       History of Present Illness   Encounter Diagnoses   Name Primary?   • Controlled type 2 diabetes mellitus without complication, without long-term current use of insulin (CMS/[facility]) Yes   • Dyslipidemia with high LDL and low HDL    • Essential hypertension    • Low testosterone    • Vitamin D deficiency    • Primary hypothyroidism    • Low testosterone in male    This is a 55-year-old male patient here today for routine follow-up visit.  He has been seen for the above-mentioned problems.  He has had some recent labs done in our office which have not fully resulted yet.  We did pull a preliminary which shows his A1c is in satisfactory range.  He does have high triglycerides 498.  He states he has had some health issues in the past 6 months.  He ate a Snickers bar which became lodged in his throat in June as a result he has had some issues and where he could not eat or swallow pills.  He went on a liquid meal replacement such as boost for nutrition.  He was hospitalized in June for blood clots in his lungs and he also had a tear in June for rotator cuff.  He is still having issues with swallowing as result of having the Snickers lodged in his throat but has gotten somewhat better.  We discussed treating his high triglycerides with Vascepa 0.5 g taking 4 pills twice daily since they are a smaller pill.  He is at risk for cardiovascular event based on his triglycerides.  He also has not been taking his rosuvastatin.  He states Lyrica was not covered on his insurance plan and he would like to go back on gabapentin for neuropathy.  He was put on Xarelto for his blood clots but has since been told he can quit taking it.  He has been " "informed that Vascepa can be contraindicated with a medication such as Xarelto.  His medication list was reviewed and updated for accuracy.  He is no longer using his continuous glucose monitoring sensor.  He did not bring his blood glucose meter with him to today's visit.  He is due for an eye exam but cannot recall who he sees.  He has been advised to schedule an appointment.      The following portions of the patient's history were reviewed and updated as appropriate:   Past Medical History:   Diagnosis Date   • Arthritis    • Carpal tunnel syndrome of right wrist    • Disease of thyroid gland    • Edema    • Hypertension    • Hypothyroidism    • Labral tear of shoulder    • Neuropathy    • Pulmonary embolism (CMS/HCC)    • Type 2 diabetes mellitus (CMS/HCC)    • Vitamin D deficiency      Past Surgical History:   Procedure Laterality Date   • SHOULDER ARTHROSCOPY W/ ROTATOR CUFF REPAIR Left 10/19/2016    Procedure: LEFT SHOULDER ARTHROSCOPY WITH ROTATOR CUFF REPAIR WITH ACROMIOPLASTY, DISTAL CLAVICLE EXCISION, LABRAL DEBRIDEMENT;  Surgeon: David Allison MD;  Location: [facility] OR [facility] [place];  Service:    • TONSILLECTOMY     • UVULECTOMY       Family History   Problem Relation Age of Onset   • Stroke Mother        Current Outpatient Medications   Medication Sig Dispense Refill   • BD HYPODERMIC NEEDLE 18G X 1\" misc USE AS DIRECTED TO WITHDRAW TESTOSTERONE 3 each 4   • carvedilol (COREG) 25 MG tablet TAKE ONE TABLET BY MOUTH DAILY 30 tablet 5   • cloNIDine (CATAPRES) 0.1 MG tablet TAKE ONE TABLET BY MOUTH TWICE A DAY 60 tablet 1   • Continuous Blood Gluc  (FREESTYLE SARBJIT 14 DAY READER) device USE ONE APPLICATION FOR 1 DOSE 1 Device 0   • Continuous Blood Gluc Sensor (FREESTYLE SARBJIT 14 DAY SENSOR) misc 1 application Every 14 (Fourteen) Days. 2 each 5   • Cyanocobalamin 1000 MCG/15ML liquid Take 5,000 mcg by mouth Daily.     • cyclobenzaprine (FLEXERIL) 10 MG tablet Take 1 tablet by mouth 3 (Three) Times a Day As " "Needed for Muscle Spasms. 12 tablet 0   • famotidine (PEPCID) 20 MG tablet Take 20 mg by mouth 2 (Two) Times a Day.     • furosemide (LASIX) 40 MG tablet Take 40 mg by mouth daily.     • ibuprofen (ADVIL,MOTRIN) 600 MG tablet Take 1 tablet by mouth Every 6 (Six) Hours As Needed for Mild Pain  or Moderate Pain  (take with food). 24 tablet 0   • JARDIANCE 25 MG tablet TAKE ONE TABLET BY MOUTH DAILY 30 tablet 2   • levothyroxine (SYNTHROID, LEVOTHROID) 175 MCG tablet TAKE ONE TABLET BY MOUTH DAILY 30 tablet 5   • lisinopril (PRINIVIL,ZESTRIL) 40 MG tablet TAKE ONE TABLET BY MOUTH DAILY 30 tablet 3   • pregabalin (Lyrica) 75 MG capsule Take 1 capsule by mouth 2 (Two) Times a Day. 60 capsule 2   • rivaroxaban (XARELTO) 10 MG tablet Take 20 mg by mouth Daily.     • rosuvastatin (CRESTOR) 40 MG tablet Take 1 tablet by mouth Every Night. 90 tablet 1   • Syringe/Needle, Disp, (B-D 3CC LUER-BETTY SYR 23GX1\") 23G X 1\" 3 ML misc USE AS DIRECTED FOR TESTOSTERONE INJECTION 4 each 0   • Testosterone Cypionate (DEPOTESTOTERONE CYPIONATE) 200 MG/ML injection INJECT 1 ML INTRAMUSCULARLY EVERY 8 DAYS 4 mL 0   • traZODone (DESYREL) 50 MG tablet Take 50 mg by mouth Every Night.     • TRULICITY 1.5 MG/0.5ML solution pen-injector INJECT 1.5 MG UNDER THE SKIN ONCE WEEKLY 4 pen 0   • vitamin D (ERGOCALCIFEROL) 1.25 MG (03581 UT) capsule capsule TAKE ONE CAPSULE BY MOUTH TWICE WEEKLY 8 capsule 4     No current facility-administered medications for this visit.      Allergies   Allergen Reactions   • Cialis [Tadalafil] Other (See Comments)     BODY ACHES   • Metformin Confusion   • Lortab [Hydrocodone-Acetaminophen] Rash   • Lotrel [Amlodipine Besy-Benazepril Hcl] Rash     Social History     Socioeconomic History   • Marital status:      Spouse name: Not on file   • Number of children: Not on file   • Years of education: Not on file   • Highest education level: Not on file   Tobacco Use   • Smoking status: Never Smoker   • Smokeless " tobacco: Never Used   Substance and Sexual Activity   • Alcohol use: No   • Drug use: No   • Sexual activity: Defer       Review of Systems  Review of Systems   Constitutional: Negative for appetite change and fatigue.   Eyes: Negative for visual disturbance.   Respiratory: Negative for cough.    Cardiovascular: Negative for leg swelling.   Gastrointestinal: Negative for constipation and diarrhea.   Endocrine: Negative for cold intolerance, heat intolerance, polydipsia, polyphagia and polyuria.   Genitourinary: Negative for frequency.   Neurological: Negative for numbness (in hands and feet-neuropathy).        Objective    Physical Exam  Vitals signs and nursing note reviewed.   Constitutional:       General: He is not in acute distress.     Appearance: Normal appearance. He is well-developed. He is obese. He is not diaphoretic.   HENT:      Head: Normocephalic and atraumatic.      Right Ear: External ear normal.      Left Ear: External ear normal.      Nose: Nose normal.   Eyes:      General:         Right eye: No discharge.         Left eye: No discharge.      Pupils: Pupils are equal, round, and reactive to light.   Neck:      Musculoskeletal: Normal range of motion and neck supple. No edema or erythema.      Thyroid: No thyromegaly.      Vascular: No carotid bruit.      Trachea: No tracheal deviation.   Cardiovascular:      Rate and Rhythm: Normal rate and regular rhythm.      Heart sounds: Normal heart sounds. No murmur. No friction rub. No gallop.    Pulmonary:      Effort: Pulmonary effort is normal. No respiratory distress.      Breath sounds: Normal breath sounds. No wheezing or rales.   Abdominal:      General: Bowel sounds are normal. There is no distension.      Palpations: Abdomen is soft.      Tenderness: There is no abdominal tenderness.   Musculoskeletal: Normal range of motion.         General: No deformity.   Lymphadenopathy:      Cervical: No cervical adenopathy.   Skin:     General: Skin is warm  and dry.      Coloration: Skin is not pale.      Findings: No erythema or rash.   Neurological:      Mental Status: He is alert and oriented to person, place, and time.      Sensory: Sensory deficit present.      Coordination: Coordination normal.      Comments: nephrology   Psychiatric:         Behavior: Behavior normal.         Thought Content: Thought content normal.         Judgment: Judgment normal.         Lab Review  Glucose (mg/dL)   Date Value   09/02/2020 109 (H)   09/01/2020 113 (H)   09/16/2019 187 (H)     Sodium (mmol/L)   Date Value   09/02/2020 137   09/01/2020 134 (L)   08/31/2020 137   08/29/2020 136 (L)   08/27/2020 135 (L)     Potassium (mmol/L)   Date Value   09/02/2020 3.8   09/01/2020 3.7   08/31/2020 3.8   08/29/2020 4.2   08/27/2020 3.7     Chloride (mmol/L)   Date Value   09/02/2020 102   09/01/2020 99   08/31/2020 103   08/29/2020 103   08/27/2020 101     CO2 (mmol/L)   Date Value   09/02/2020 26.7   09/01/2020 26.1     Total CO2 (mmol/L)   Date Value   08/31/2020 27   08/29/2020 26   08/27/2020 27   05/22/2020 25   08/19/2019 24   02/23/2018 27     BUN (mg/dL)   Date Value   09/02/2020 10   09/01/2020 10   08/31/2020 8   08/29/2020 9   08/27/2020 9     Creatinine (mg/dL)   Date Value   09/02/2020 0.90   09/01/2020 1.10   08/31/2020 0.6 (L)   08/29/2020 0.6 (L)   08/27/2020 0.6 (L)     Hemoglobin A1C (%)   Date Value   09/02/2020 6.20 (H)   05/22/2020 6.5 (H)   08/19/2019 7.0 (H)   01/18/2019 7.86 (H)   10/25/2018 7.50 (H)   07/13/2018 6.70 (H)     Triglycerides (mg/dL)   Date Value   05/22/2020 325 (H)   08/19/2019 195 (H)   01/18/2019 304 (H)   10/25/2018 190 (H)   07/13/2018 211 (H)   02/23/2018 223 (H)     LDL Cholesterol  (mg/dL)   Date Value   05/22/2020 115 (H)   08/19/2019 77   01/18/2019 66   10/25/2018 49   07/13/2018 142 (H)   02/23/2018 132 (H)       Assessment/Plan      1. Controlled type 2 diabetes mellitus without complication, without long-term current use of insulin  "(CMS/[facility])    2. Dyslipidemia with high LDL and low HDL    3. Essential hypertension    4. Low testosterone    5. Vitamin D deficiency    6. Primary hypothyroidism    .    Medications prescribed:  Outpatient Encounter Medications as of 12/11/2020   Medication Sig Dispense Refill   • BD HYPODERMIC NEEDLE 18G X 1\" misc USE AS DIRECTED TO WITHDRAW TESTOSTERONE 3 each 4   • carvedilol (COREG) 25 MG tablet TAKE ONE TABLET BY MOUTH DAILY 30 tablet 5   • cloNIDine (CATAPRES) 0.1 MG tablet TAKE ONE TABLET BY MOUTH TWICE A DAY 60 tablet 1   • Continuous Blood Gluc  (FREESTYLE SARBJIT 14 DAY READER) device USE ONE APPLICATION FOR 1 DOSE 1 Device 0   • Continuous Blood Gluc Sensor (FREESTYLE SARBJIT 14 DAY SENSOR) misc 1 application Every 14 (Fourteen) Days. 2 each 5   • Cyanocobalamin 1000 MCG/15ML liquid Take 5,000 mcg by mouth Daily.     • cyclobenzaprine (FLEXERIL) 10 MG tablet Take 1 tablet by mouth 3 (Three) Times a Day As Needed for Muscle Spasms. 12 tablet 0   • famotidine (PEPCID) 20 MG tablet Take 20 mg by mouth 2 (Two) Times a Day.     • furosemide (LASIX) 40 MG tablet Take 40 mg by mouth daily.     • ibuprofen (ADVIL,MOTRIN) 600 MG tablet Take 1 tablet by mouth Every 6 (Six) Hours As Needed for Mild Pain  or Moderate Pain  (take with food). 24 tablet 0   • JARDIANCE 25 MG tablet TAKE ONE TABLET BY MOUTH DAILY 30 tablet 2   • levothyroxine (SYNTHROID, LEVOTHROID) 175 MCG tablet TAKE ONE TABLET BY MOUTH DAILY 30 tablet 5   • lisinopril (PRINIVIL,ZESTRIL) 40 MG tablet TAKE ONE TABLET BY MOUTH DAILY 30 tablet 3   • pregabalin (Lyrica) 75 MG capsule Take 1 capsule by mouth 2 (Two) Times a Day. 60 capsule 2   • rivaroxaban (XARELTO) 10 MG tablet Take 20 mg by mouth Daily.     • rosuvastatin (CRESTOR) 40 MG tablet Take 1 tablet by mouth Every Night. 90 tablet 1   • Syringe/Needle, Disp, (B-D 3CC LUER-BETTY SYR 23GX1\") 23G X 1\" 3 ML misc USE AS DIRECTED FOR TESTOSTERONE INJECTION 4 each 0   • Testosterone Cypionate " (DEPOTESTOTERONE CYPIONATE) 200 MG/ML injection INJECT 1 ML INTRAMUSCULARLY EVERY 8 DAYS 4 mL 0   • traZODone (DESYREL) 50 MG tablet Take 50 mg by mouth Every Night.     • TRULICITY 1.5 MG/0.5ML solution pen-injector INJECT 1.5 MG UNDER THE SKIN ONCE WEEKLY 4 pen 0   • vitamin D (ERGOCALCIFEROL) 1.25 MG (35985 UT) capsule capsule TAKE ONE CAPSULE BY MOUTH TWICE WEEKLY 8 capsule 4     No facility-administered encounter medications on file as of 12/11/2020.        Orders placed during this encounter include:  No orders of the defined types were placed in this encounter.    Patient was seen and examined.  He has been started on vascepa to treat his high triglycerides.  He has been advised to restart his rosuvastatin.  He has been advised to schedule an eye exam for routine eye exam appointment.  He has not been checking his blood sugars.  According to his A1c his blood sugars are in satisfactory range.  He denies any hypoglycemic events.  He is currently not at risk for any hypoglycemic events being on Jardiance and Trulicity.  He is on testosterone therapy and a Juice has been requested.  He has not been taking his testosterone therapy consistently.  He has been represcribed gabapentin 100 mg 3 times daily for complaints of neuropathy.  He reports Lyrica was not covered on his insurance plan.  His thyroid hormones are in satisfactory range.  He will follow-up in 6 months with labs prior.  Has been encouraged to reach out to the office should he have any questions or concerns prior to his next visit.

## 2020-12-11 NOTE — PATIENT INSTRUCTIONS
vacepa 0.5 grams 4 pills twice daily  Make eye exam appt   Restart crestor  Restart gabapentin 100 mg 1 pill up to 3 times daily

## 2020-12-14 RX ORDER — ERGOCALCIFEROL 1.25 MG/1
CAPSULE ORAL
Qty: 8 CAPSULE | Refills: 5 | Status: SHIPPED | OUTPATIENT
Start: 2020-12-14 | End: 2021-06-14 | Stop reason: SDUPTHER

## 2020-12-14 RX ORDER — SYRINGE WITH NEEDLE, 1 ML 25GX5/8"
SYRINGE, EMPTY DISPOSABLE MISCELLANEOUS
Qty: 4 EACH | Refills: 5 | Status: SHIPPED | OUTPATIENT
Start: 2020-12-14 | End: 2021-01-07 | Stop reason: SDUPTHER

## 2020-12-16 LAB
25(OH)D3+25(OH)D2 SERPL-MCNC: 30.8 NG/ML (ref 30–100)
ALBUMIN SERPL-MCNC: 4.1 G/DL (ref 3.5–5.2)
ALBUMIN/GLOB SERPL: 1.8 G/DL
ALP SERPL-CCNC: 47 U/L (ref 39–117)
ALT SERPL-CCNC: 32 U/L (ref 1–41)
AST SERPL-CCNC: 23 U/L (ref 1–40)
BILIRUB SERPL-MCNC: 0.2 MG/DL (ref 0–1.2)
BUN SERPL-MCNC: 17 MG/DL (ref 6–20)
BUN/CREAT SERPL: 22.7 (ref 7–25)
C PEPTIDE SERPL-MCNC: 14.9 NG/ML (ref 1.1–4.4)
CALCIUM SERPL-MCNC: 9.1 MG/DL (ref 8.6–10.5)
CHLORIDE SERPL-SCNC: 97 MMOL/L (ref 98–107)
CHOLEST SERPL-MCNC: 201 MG/DL (ref 0–200)
CO2 SERPL-SCNC: 24.7 MMOL/L (ref 22–29)
CREAT SERPL-MCNC: 0.75 MG/DL (ref 0.76–1.27)
GLOBULIN SER CALC-MCNC: 2.3 GM/DL
GLUCOSE SERPL-MCNC: 203 MG/DL (ref 65–99)
HBA1C MFR BLD: 5.9 % (ref 4.8–5.6)
HCT VFR BLD AUTO: 47.7 % (ref 37.5–51)
HDLC SERPL-MCNC: 38 MG/DL (ref 40–60)
HGB BLD-MCNC: 15.7 G/DL (ref 13–17.7)
INTERPRETATION: NORMAL
LDLC SERPL CALC-MCNC: 83 MG/DL (ref 0–100)
Lab: NORMAL
POTASSIUM SERPL-SCNC: 4.4 MMOL/L (ref 3.5–5.2)
PROT SERPL-MCNC: 6.4 G/DL (ref 6–8.5)
SODIUM SERPL-SCNC: 134 MMOL/L (ref 136–145)
T3FREE SERPL-MCNC: 3 PG/ML (ref 2–4.4)
T4 FREE SERPL-MCNC: 1.11 NG/DL (ref 0.93–1.7)
TESTOST FREE SERPL-MCNC: ABNORMAL PG/ML
TESTOST SERPL-MCNC: 62 NG/DL (ref 264–916)
TRIGL SERPL-MCNC: 498 MG/DL (ref 0–150)
TSH SERPL DL<=0.005 MIU/L-ACNC: 1.17 UIU/ML (ref 0.27–4.2)
VLDLC SERPL CALC-MCNC: 80 MG/DL (ref 5–40)

## 2021-01-04 DIAGNOSIS — I10 ESSENTIAL HYPERTENSION: ICD-10-CM

## 2021-01-04 DIAGNOSIS — R73.03 PRE-DIABETES: ICD-10-CM

## 2021-01-04 DIAGNOSIS — E03.9 HYPOTHYROIDISM, UNSPECIFIED TYPE: ICD-10-CM

## 2021-01-04 DIAGNOSIS — E78.5 HYPERLIPIDEMIA, UNSPECIFIED HYPERLIPIDEMIA TYPE: ICD-10-CM

## 2021-01-04 DIAGNOSIS — R79.89 LOW TESTOSTERONE: ICD-10-CM

## 2021-01-04 RX ORDER — LEVOTHYROXINE SODIUM 175 UG/1
TABLET ORAL
Qty: 30 TABLET | Refills: 4 | Status: SHIPPED | OUTPATIENT
Start: 2021-01-04 | End: 2021-06-24 | Stop reason: SDUPTHER

## 2021-01-07 DIAGNOSIS — R79.89 LOW TESTOSTERONE: Primary | ICD-10-CM

## 2021-01-07 RX ORDER — SYRINGE WITH NEEDLE, 1 ML 25GX5/8"
SYRINGE, EMPTY DISPOSABLE MISCELLANEOUS
Qty: 4 EACH | Refills: 5 | Status: SHIPPED | OUTPATIENT
Start: 2021-01-07 | End: 2021-02-02 | Stop reason: SDUPTHER

## 2021-02-02 DIAGNOSIS — R79.89 LOW TESTOSTERONE: ICD-10-CM

## 2021-02-02 DIAGNOSIS — R79.89 LOW TESTOSTERONE IN MALE: ICD-10-CM

## 2021-02-02 RX ORDER — EMPAGLIFLOZIN 25 MG/1
TABLET, FILM COATED ORAL
Qty: 30 TABLET | Refills: 0 | Status: SHIPPED | OUTPATIENT
Start: 2021-02-02 | End: 2021-03-08

## 2021-02-02 NOTE — TELEPHONE ENCOUNTER
nv 6/21 romario powell 12/20 romario leach scanned in    Patient also wants bigger needles for him to draw testosterone. I tried calling patient no answer left message

## 2021-02-03 DIAGNOSIS — R63.5 ABNORMAL WEIGHT GAIN: ICD-10-CM

## 2021-02-03 DIAGNOSIS — E78.5 HYPERLIPIDEMIA, UNSPECIFIED HYPERLIPIDEMIA TYPE: ICD-10-CM

## 2021-02-03 DIAGNOSIS — E03.9 HYPOTHYROIDISM, UNSPECIFIED TYPE: ICD-10-CM

## 2021-02-03 DIAGNOSIS — I10 ESSENTIAL HYPERTENSION: ICD-10-CM

## 2021-02-03 DIAGNOSIS — E78.5 DYSLIPIDEMIA WITH HIGH LDL AND LOW HDL: ICD-10-CM

## 2021-02-03 DIAGNOSIS — R79.89 LOW TESTOSTERONE: ICD-10-CM

## 2021-02-03 DIAGNOSIS — E11.9 CONTROLLED TYPE 2 DIABETES MELLITUS WITHOUT COMPLICATION, WITHOUT LONG-TERM CURRENT USE OF INSULIN (HCC): ICD-10-CM

## 2021-02-03 RX ORDER — CLONIDINE HYDROCHLORIDE 0.1 MG/1
TABLET ORAL
Qty: 60 TABLET | Refills: 6 | Status: SHIPPED | OUTPATIENT
Start: 2021-02-03

## 2021-02-03 RX ORDER — SYRINGE WITH NEEDLE, 1 ML 25GX5/8"
SYRINGE, EMPTY DISPOSABLE MISCELLANEOUS
Qty: 4 EACH | Refills: 5 | Status: SHIPPED | OUTPATIENT
Start: 2021-02-03

## 2021-02-03 RX ORDER — LISINOPRIL 40 MG/1
TABLET ORAL
Qty: 30 TABLET | Refills: 6 | Status: SHIPPED | OUTPATIENT
Start: 2021-02-03 | End: 2021-09-15 | Stop reason: SDUPTHER

## 2021-02-03 RX ORDER — TESTOSTERONE CYPIONATE 200 MG/ML
INJECTION, SOLUTION INTRAMUSCULAR
Qty: 4 ML | Refills: 0 | Status: SHIPPED | OUTPATIENT
Start: 2021-02-03

## 2021-03-08 RX ORDER — EMPAGLIFLOZIN 25 MG/1
TABLET, FILM COATED ORAL
Qty: 30 TABLET | Refills: 5 | Status: SHIPPED | OUTPATIENT
Start: 2021-03-08 | End: 2021-09-24 | Stop reason: SDUPTHER

## 2021-03-14 DIAGNOSIS — E03.9 PRIMARY HYPOTHYROIDISM: ICD-10-CM

## 2021-03-14 DIAGNOSIS — I10 ESSENTIAL HYPERTENSION: ICD-10-CM

## 2021-03-14 DIAGNOSIS — R79.89 LOW TESTOSTERONE: ICD-10-CM

## 2021-03-14 DIAGNOSIS — E55.9 VITAMIN D DEFICIENCY: ICD-10-CM

## 2021-03-14 DIAGNOSIS — R79.89 LOW TESTOSTERONE IN MALE: ICD-10-CM

## 2021-03-14 DIAGNOSIS — E78.5 DYSLIPIDEMIA WITH HIGH LDL AND LOW HDL: ICD-10-CM

## 2021-03-14 DIAGNOSIS — E11.9 CONTROLLED TYPE 2 DIABETES MELLITUS WITHOUT COMPLICATION, WITHOUT LONG-TERM CURRENT USE OF INSULIN (HCC): ICD-10-CM

## 2021-03-17 RX ORDER — GABAPENTIN 100 MG/1
CAPSULE ORAL
Qty: 90 CAPSULE | Refills: 0 | Status: SHIPPED | OUTPATIENT
Start: 2021-03-17 | End: 2021-09-24

## 2021-03-24 ENCOUNTER — BULK ORDERING (OUTPATIENT)
Dept: CASE MANAGEMENT | Facility: OTHER | Age: 56
End: 2021-03-24

## 2021-03-24 DIAGNOSIS — Z23 IMMUNIZATION DUE: ICD-10-CM

## 2021-03-26 RX ORDER — ROSUVASTATIN CALCIUM 40 MG/1
TABLET, COATED ORAL
Qty: 30 TABLET | Refills: 0 | Status: SHIPPED | OUTPATIENT
Start: 2021-03-26 | End: 2021-04-27 | Stop reason: SDUPTHER

## 2021-04-05 RX ORDER — CARVEDILOL 25 MG/1
TABLET ORAL
Qty: 30 TABLET | Refills: 0 | Status: SHIPPED | OUTPATIENT
Start: 2021-04-05 | End: 2021-05-25 | Stop reason: SDUPTHER

## 2021-04-27 RX ORDER — ROSUVASTATIN CALCIUM 40 MG/1
40 TABLET, COATED ORAL NIGHTLY
Qty: 30 TABLET | Refills: 0 | Status: SHIPPED | OUTPATIENT
Start: 2021-04-27 | End: 2021-05-25

## 2021-05-25 RX ORDER — CARVEDILOL 25 MG/1
25 TABLET ORAL DAILY
Qty: 30 TABLET | Refills: 0 | Status: SHIPPED | OUTPATIENT
Start: 2021-05-25

## 2021-05-25 RX ORDER — ROSUVASTATIN CALCIUM 40 MG/1
TABLET, COATED ORAL
Qty: 30 TABLET | Refills: 0 | Status: SHIPPED | OUTPATIENT
Start: 2021-05-25 | End: 2021-06-21

## 2021-06-14 RX ORDER — ERGOCALCIFEROL 1.25 MG/1
CAPSULE ORAL
Qty: 8 CAPSULE | Refills: 5 | Status: SHIPPED | OUTPATIENT
Start: 2021-06-14 | End: 2021-12-08

## 2021-06-14 NOTE — TELEPHONE ENCOUNTER
Patient is calling for a refill on     vitamin D (ERGOCALCIFEROL) 1.25 MG (18707 UT) capsule capsule    Sent to     [facility] [address] [place] [address] [address] [facility] [address] - [phone]  - [phone] FX   Phone:  [phone]   Fax:  [phone]

## 2021-06-21 RX ORDER — CARVEDILOL 25 MG/1
TABLET ORAL
Qty: 30 TABLET | Refills: 6 | OUTPATIENT
Start: 2021-06-21

## 2021-06-21 RX ORDER — ROSUVASTATIN CALCIUM 40 MG/1
TABLET, COATED ORAL
Qty: 30 TABLET | Refills: 6 | Status: SHIPPED | OUTPATIENT
Start: 2021-06-21

## 2021-06-24 DIAGNOSIS — R79.89 LOW TESTOSTERONE: ICD-10-CM

## 2021-06-24 DIAGNOSIS — E78.5 HYPERLIPIDEMIA, UNSPECIFIED HYPERLIPIDEMIA TYPE: ICD-10-CM

## 2021-06-24 DIAGNOSIS — R73.03 PRE-DIABETES: ICD-10-CM

## 2021-06-24 DIAGNOSIS — I10 ESSENTIAL HYPERTENSION: ICD-10-CM

## 2021-06-24 DIAGNOSIS — E03.9 HYPOTHYROIDISM, UNSPECIFIED TYPE: ICD-10-CM

## 2021-06-24 RX ORDER — LEVOTHYROXINE SODIUM 175 UG/1
175 TABLET ORAL DAILY
Qty: 30 TABLET | Refills: 2 | Status: SHIPPED | OUTPATIENT
Start: 2021-06-24 | End: 2021-12-20

## 2021-06-28 RX ORDER — CARVEDILOL 25 MG/1
25 TABLET ORAL DAILY
Qty: 30 TABLET | Refills: 2 | OUTPATIENT
Start: 2021-06-28

## 2021-09-15 DIAGNOSIS — R79.89 LOW TESTOSTERONE: ICD-10-CM

## 2021-09-15 DIAGNOSIS — E11.9 CONTROLLED TYPE 2 DIABETES MELLITUS WITHOUT COMPLICATION, WITHOUT LONG-TERM CURRENT USE OF INSULIN (HCC): ICD-10-CM

## 2021-09-15 DIAGNOSIS — E03.9 HYPOTHYROIDISM, UNSPECIFIED TYPE: ICD-10-CM

## 2021-09-15 DIAGNOSIS — E78.5 DYSLIPIDEMIA WITH HIGH LDL AND LOW HDL: ICD-10-CM

## 2021-09-15 DIAGNOSIS — E78.5 HYPERLIPIDEMIA, UNSPECIFIED HYPERLIPIDEMIA TYPE: ICD-10-CM

## 2021-09-15 DIAGNOSIS — I10 ESSENTIAL HYPERTENSION: ICD-10-CM

## 2021-09-15 DIAGNOSIS — R63.5 ABNORMAL WEIGHT GAIN: ICD-10-CM

## 2021-09-15 RX ORDER — LISINOPRIL 40 MG/1
40 TABLET ORAL DAILY
Qty: 30 TABLET | Refills: 1 | Status: SHIPPED | OUTPATIENT
Start: 2021-09-15 | End: 2021-11-15

## 2021-09-24 ENCOUNTER — OFFICE VISIT (OUTPATIENT)
Dept: ENDOCRINOLOGY | Age: 56
End: 2021-09-24

## 2021-09-24 VITALS
SYSTOLIC BLOOD PRESSURE: 124 MMHG | DIASTOLIC BLOOD PRESSURE: 72 MMHG | BODY MASS INDEX: 42.66 KG/M2 | WEIGHT: 315 LBS | HEIGHT: 72 IN

## 2021-09-24 DIAGNOSIS — E03.9 PRIMARY HYPOTHYROIDISM: ICD-10-CM

## 2021-09-24 DIAGNOSIS — E11.9 CONTROLLED TYPE 2 DIABETES MELLITUS WITHOUT COMPLICATION, WITHOUT LONG-TERM CURRENT USE OF INSULIN (HCC): ICD-10-CM

## 2021-09-24 DIAGNOSIS — I10 ESSENTIAL HYPERTENSION: ICD-10-CM

## 2021-09-24 DIAGNOSIS — E11.9 CONTROLLED TYPE 2 DIABETES MELLITUS WITHOUT COMPLICATION, WITHOUT LONG-TERM CURRENT USE OF INSULIN (HCC): Primary | ICD-10-CM

## 2021-09-24 DIAGNOSIS — E78.1 HYPERTRIGLYCERIDEMIA: ICD-10-CM

## 2021-09-24 DIAGNOSIS — E55.9 VITAMIN D DEFICIENCY: ICD-10-CM

## 2021-09-24 DIAGNOSIS — R79.89 LOW TESTOSTERONE: ICD-10-CM

## 2021-09-24 DIAGNOSIS — E78.2 MIXED HYPERLIPIDEMIA: ICD-10-CM

## 2021-09-24 PROCEDURE — 99214 OFFICE O/P EST MOD 30 MIN: CPT | Performed by: NURSE PRACTITIONER

## 2021-09-24 RX ORDER — EMPAGLIFLOZIN 25 MG/1
25 TABLET, FILM COATED ORAL DAILY
Qty: 30 TABLET | Refills: 5 | Status: SHIPPED | OUTPATIENT
Start: 2021-09-24 | End: 2021-09-24 | Stop reason: SDUPTHER

## 2021-09-24 RX ORDER — EMPAGLIFLOZIN 25 MG/1
25 TABLET, FILM COATED ORAL DAILY
Qty: 30 TABLET | Refills: 5 | Status: SHIPPED | OUTPATIENT
Start: 2021-09-24 | End: 2022-03-08 | Stop reason: SDUPTHER

## 2021-09-24 NOTE — PROGRESS NOTES
Chief Complaint  Chief Complaint   Patient presents with   • Diabetes     testing bs 0 x day / last dm eye exam over 1 yr ago        Subjective          History of Present Illness    Adarsh Davison 56 y.o. presents for a follow-up evaluation for type 2 DM    He has been diabetic since 2016    Pt is on the following medications for their DM: jardiance 25mg daily. Has been off for over 6 months trulicity 1.5mg weekly due to cost.    Pt complains numbness and tingling in feet/hands.    Denies Fatigue, diarrhea or constipation, difficulty breathing,chest pain, palpitations, vision changes    Weight gain 7lbs    Pt does not have a history of DM retinopathy.  Last eye exam was 2020    Pt does not have a history of nephropathy.  Patient is currently taking ACE    Pt does have neuropathy.  Was taking gabapentin 100mg TID for this condition, but stopped.    Pt does not have a history of CAD or CVA.    Last A1C in 12/20 was 5.9    Microalbumin in 10/20 was normal      Blood Sugars    Blood glucoses are not checked.    Pt has none episodes of hypoglycemia.        Hypothyroid    PT complains of gaining weight of about  7 lbs,dry skin, heat intolerance.    Denies Fatigue , dry skin, diarrhea or constipation difficulty breathing,chest pain, or palpitations.    Current treatment is levothyroxine 175 mcg daily    Last labs in 12/20 showed all within normal range        Hyperlipidemia     Pt denies any muscle/body aches, chest pain, or shortness of breath    Pt is currently taking crestor 40mg     Last lipid panel in 12/20 showed Total 201, Tri 498, HDL 38, LDL 83        Hypertension    Pt denies any chest pain, palpitations, shortness of breath, or headache    Current regimen includes carvedilol, lasix, and lisinopril        Hypertriglyceridemia    Pt denies any chest pain, shortness of breath, ABD pain, or body/muscle aches.    Currently taking Vascepa        Vitamin D Deficiency    Current treatment includes 50,000 units twice  "weekly    Last Vit D level was 30.8 in 12/2020      Hypogonadism    Current treatment includes Depotestosterone 1 mL IM every 8 days    Last labs in 12/4/2020 showed total testosterone at 62.  H&H normal.  PSA 10/20 was 0.5    Last dose was 3 weeks ago    Doesn't take regularly    PCP started on testosterone over 10 years ago    Pt goes have two boys 32y/o and 15y/o.    Patient was hospitalized in June 2020 for bilateral Pes which he was told was caused by recent surgery.        I have reviewed the patient's allergies, medicines, past medical hx, family hx and social hx.    Objective   Vital Signs:   /72 (BP Location: Left arm, Patient Position: Sitting, Cuff Size: Large Adult)   Ht 182.9 cm (72.01\")   Wt (!) 148 kg (327 lb)   BMI 44.34 kg/m²       Physical Exam   Physical Exam  Constitutional:       General: He is not in acute distress.     Appearance: Normal appearance. He is obese. He is not diaphoretic.   HENT:      Head: Normocephalic and atraumatic.   Eyes:      General:         Right eye: No discharge.         Left eye: No discharge.   Cardiovascular:      Rate and Rhythm: Normal rate and regular rhythm.      Heart sounds: Normal heart sounds. No murmur heard.   No friction rub. No gallop.    Pulmonary:      Effort: Pulmonary effort is normal. No respiratory distress.      Breath sounds: Normal breath sounds. No wheezing.   Skin:     General: Skin is warm and dry.   Neurological:      Mental Status: He is alert.   Psychiatric:         Mood and Affect: Mood normal.         Behavior: Behavior normal.                    Results Review:   Hemoglobin A1C   Date Value Ref Range Status   04/23/2021 7.1 (H) 4.3 - 5.6 % Final     Comment:     (note)  A1C% Reference Range:  4.3 - 5.6  Normal range  5.7 - 6.4  Pre-diabetic -increased risk for developing diabetes mellitus.  >=6.5      Diabetic -diagnostic of diabetes mellitus.     Note: For diagnosis of diabetes in individuals without unequivocal "   hyperglycemia, results should be confirmed by repeat testing.  Patients with conditions that shorten erythrocyte survival, such as  recovery from acute blood loss, hemolytic anemia, kidney disease,  or the presence of unstable hemoglobins like HbSS, HbCC, and HbSC  may yield falsely decreased HbA1c test results. Iron deficiency may  yield falsely increased HbA1c test results.     Triglycerides   Date Value Ref Range Status   12/04/2020 498 (H) 0 - 150 mg/dL Final   05/22/2020 325 (H) 0 - 149 mg/dL Final     HDL Cholesterol   Date Value Ref Range Status   12/04/2020 38 (L) 40 - 60 mg/dL Final   05/22/2020 34 (L) >39 mg/dL Final     LDL Cholesterol    Date Value Ref Range Status   05/22/2020 115 (H) 0 - 100 mg/dL Final     LDL Chol Calc (NIH)   Date Value Ref Range Status   12/04/2020 83 0 - 100 mg/dL Final     VLDL Cholesterol   Date Value Ref Range Status   05/22/2020 65 (H) 5 - 40 mg/dL Final     VLDL Cholesterol Nithin   Date Value Ref Range Status   12/04/2020 80 (H) 5 - 40 mg/dL Final         Assessment and Plan {CC Problem List  Visit Diagnosis  ROS  Review (Popup)  Health Maintenance  Quality  BestPractice  Medications  SmartSets  SnapShot Encounters  Media :23  Diagnoses and all orders for this visit:    1. Controlled type 2 diabetes mellitus without complication, without long-term current use of insulin (CMS/[facility]) (Primary)  -     Discontinue: Jardiance 25 MG tablet tablet; Take 1 tablet by mouth Daily.  Dispense: 30 tablet; Refill: 5  -     Comprehensive Metabolic Panel; Future  -     Hemoglobin A1c; Future  -     Microalbumin / Creatinine Urine Ratio - Urine, Clean Catch; Future  -     CBC & Differential; Future  -     Jardiance 25 MG tablet tablet; Take 1 tablet by mouth Daily.  Dispense: 30 tablet; Refill: 5    Continue with Jardiance for now until we check A1C  Had to stop Trulicity due to cost.    2. Primary hypothyroidism  -     Comprehensive Metabolic Panel; Future  -     TSH; Future  -      T4, Free; Future  -     T3, Free; Future    Continue with current medication regimen  Check labs      3. Essential hypertension  -     CBC & Differential; Future    Continue with current medication regimen  Check labs      4. Mixed hyperlipidemia  -     Lipid Panel; Future    Continue with current medication regimen  Check labs      5. Low testosterone  -     Prolactin; Future  -     Luteinizing Hormone; Future  -     Follicle Stimulating Hormone; Future  -     Testosterone, Free / Tot Equilib; Future  -     CBC & Differential; Future  Check PSA as well    Patient states that he was started by PCP over 10 years ago.  Will need to check labs before restarting prescription.  Patient has a history of PEs and patient was educated about the risk of blood clots while taking testosterone replacement.    6. Vitamin D deficiency  -     Vitamin D 25 Hydroxy; Future    Continue with current medication regimen  Check labs    7. Hypertriglyceridemia  -     Lipid Panel; Future    Continue with Vascepa  Check labs             Refills sent to pharmacy - going to try use [facility] to help with cost.      RTC in 4 months      Follow Up     Patient was given instructions and counseling regarding her condition or for health maintenance advice. Please see specific information pulled into the AVS if appropriate.              Edith Nam, JUAN  09/24/21

## 2021-10-01 ENCOUNTER — TELEPHONE (OUTPATIENT)
Dept: ENDOCRINOLOGY | Age: 56
End: 2021-10-01

## 2021-10-01 NOTE — TELEPHONE ENCOUNTER
jardiance will be delivered to his house. His copay is $10.    His car is broken down so not able to come to the office today

## 2021-11-03 ENCOUNTER — SPECIALTY PHARMACY (OUTPATIENT)
Dept: PHARMACY | Facility: HOSPITAL | Age: 56
End: 2021-11-03

## 2021-11-03 NOTE — PROGRESS NOTES
Specialty Pharmacy Refill Coordination Note     Adarsh is a 56 y.o. male contacted today regarding refills of his specialty medication(s).    Reviewed and verified with patient:    jardiance $10 - left voicemail 3rd attempt       Specialty medication(s) and dose(s) confirmed: no  Changes to medications: no  Changes to insurance: no                   Follow-up: 1 day(s)     SANCHEZ HOWARD  Specialty Pharmacy Technician

## 2021-11-05 ENCOUNTER — SPECIALTY PHARMACY (OUTPATIENT)
Dept: ENDOCRINOLOGY | Age: 56
End: 2021-11-05

## 2021-11-05 NOTE — PROGRESS NOTES
Specialty Pharmacy Refill Coordination Note     Adarsh is a 56 y.o. male contacted today regarding refills of  1 specialty medication(s).    Reviewed and verified with patient:      Specialty medication(s) and dose(s) confirmed: yes    Refill Questions      Most Recent Value   Changes to allergies? No   Changes to medications? No   New conditions since last clinic visit No   Unplanned office visit, urgent care, ED, or hospital admission in the last 4 weeks  No   How does patient/caregiver feel medication is working? Very good   Financial problems or insurance changes  No          Delivery Questions      Most Recent Value   Delivery method [employer]   Delivery address correct? Yes   Preferred delivery time? Anytime   Number of medications in delivery 1   Medication being filled and delivered jardiance $10   Doses left of specialty medications 3   Is there any medication that is due not being filled? No   Supplies needed? No supplies needed   Cooler needed? No   Do any medications need mixed or dated? No   Copay form of payment Credit card on file   Questions or concerns for the pharmacist? No   Are any medications first time fills? No   Shipment status Delivery complete            Medication Adherence    Any gaps in refill history greater than 2 weeks in the last 3 months: no  Demonstrates understanding of importance of adherence: yes  Informant: patient  Reliability of informant: reliable  Provider-estimated medication adherence level: %  Reasons for non-adherence: no problems identified  Support network for adherence: family member          Follow-up: 1 month(s)     SANCHEZ HOWARD  Specialty Pharmacy Technician

## 2021-11-10 ENCOUNTER — SPECIALTY PHARMACY (OUTPATIENT)
Dept: ENDOCRINOLOGY | Age: 56
End: 2021-11-10

## 2021-11-13 DIAGNOSIS — E03.9 HYPOTHYROIDISM, UNSPECIFIED TYPE: ICD-10-CM

## 2021-11-13 DIAGNOSIS — E11.9 CONTROLLED TYPE 2 DIABETES MELLITUS WITHOUT COMPLICATION, WITHOUT LONG-TERM CURRENT USE OF INSULIN (HCC): ICD-10-CM

## 2021-11-13 DIAGNOSIS — E78.5 DYSLIPIDEMIA WITH HIGH LDL AND LOW HDL: ICD-10-CM

## 2021-11-13 DIAGNOSIS — R63.5 ABNORMAL WEIGHT GAIN: ICD-10-CM

## 2021-11-13 DIAGNOSIS — R79.89 LOW TESTOSTERONE: ICD-10-CM

## 2021-11-13 DIAGNOSIS — E78.5 HYPERLIPIDEMIA, UNSPECIFIED HYPERLIPIDEMIA TYPE: ICD-10-CM

## 2021-11-13 DIAGNOSIS — I10 ESSENTIAL HYPERTENSION: ICD-10-CM

## 2021-11-15 RX ORDER — LISINOPRIL 40 MG/1
TABLET ORAL
Qty: 90 TABLET | Refills: 1 | Status: SHIPPED | OUTPATIENT
Start: 2021-11-15 | End: 2022-05-11

## 2021-12-08 RX ORDER — ERGOCALCIFEROL 1.25 MG/1
CAPSULE ORAL
Qty: 8 CAPSULE | Refills: 5 | Status: SHIPPED | OUTPATIENT
Start: 2021-12-08

## 2021-12-09 ENCOUNTER — SPECIALTY PHARMACY (OUTPATIENT)
Dept: ENDOCRINOLOGY | Age: 56
End: 2021-12-09

## 2021-12-09 NOTE — PROGRESS NOTES
Specialty Pharmacy Refill Coordination Note     Adarsh is a 56 y.o. male contacted today regarding refills of  1 specialty medication(s).    Reviewed and verified with patient: jardiance    Specialty medication(s) and dose(s) confirmed: yes    Refill Questions      Most Recent Value   Changes to allergies? No   Changes to medications? No   New conditions since last clinic visit No   Unplanned office visit, urgent care, ED, or hospital admission in the last 4 weeks  No   How does patient/caregiver feel medication is working? Very good   Financial problems or insurance changes  No          Delivery Questions      Most Recent Value   Delivery method [employer]Ex   Delivery address correct? Yes   Preferred delivery time? Anytime   Number of medications in delivery 1   Medication being filled and delivered jardiance   Is there any medication that is due not being filled? No   Supplies needed? No supplies needed   Cooler needed? No   Do any medications need mixed or dated? No   Copay form of payment Credit card on file   Questions or concerns for the pharmacist? No   Are any medications first time fills? No            Medication Adherence    Any gaps in refill history greater than 2 weeks in the last 3 months: no  Demonstrates understanding of importance of adherence: yes  Informant: patient  Reliability of informant: reliable  Provider-estimated medication adherence level: %  Reasons for non-adherence: no problems identified  Support network for adherence: family member          Follow-up: 1 month(s)     SANCHEZ HOWARD  Specialty Pharmacy Technician

## 2021-12-19 DIAGNOSIS — E03.9 HYPOTHYROIDISM, UNSPECIFIED TYPE: ICD-10-CM

## 2021-12-19 DIAGNOSIS — I10 ESSENTIAL HYPERTENSION: ICD-10-CM

## 2021-12-19 DIAGNOSIS — E78.5 HYPERLIPIDEMIA, UNSPECIFIED HYPERLIPIDEMIA TYPE: ICD-10-CM

## 2021-12-19 DIAGNOSIS — R73.03 PRE-DIABETES: ICD-10-CM

## 2021-12-19 DIAGNOSIS — R79.89 LOW TESTOSTERONE: ICD-10-CM

## 2021-12-20 RX ORDER — LEVOTHYROXINE SODIUM 175 UG/1
TABLET ORAL
Qty: 30 TABLET | Refills: 2 | Status: SHIPPED | OUTPATIENT
Start: 2021-12-20

## 2022-01-10 ENCOUNTER — SPECIALTY PHARMACY (OUTPATIENT)
Dept: ENDOCRINOLOGY | Age: 57
End: 2022-01-10

## 2022-01-10 NOTE — PROGRESS NOTES
Specialty Pharmacy Refill Coordination Note     Adarsh is a 56 y.o. male contacted today regarding refills of  1 specialty medication(s).    Reviewed and verified with patient:       Specialty medication(s) and dose(s) confirmed: yes    Refill Questions      Most Recent Value   Changes to allergies? No   Changes to medications? No   New conditions since last clinic visit No   Unplanned office visit, urgent care, ED, or hospital admission in the last 4 weeks  No   How does patient/caregiver feel medication is working? Very good   Financial problems or insurance changes  No   Does this patient require a clinical escalation to a pharmacist? No          Delivery Questions      Most Recent Value   Delivery method [employer]Ex   Delivery address correct? Yes   Preferred delivery time? Anytime   Number of medications in delivery 1   Medication being filled and delivered JARDIANCE   Is there any medication that is due not being filled? No   Supplies needed? No supplies needed   Cooler needed? No   Do any medications need mixed or dated? No   Copay form of payment Credit card on file   Questions or concerns for the pharmacist? No   Are any medications first time fills? No            Medication Adherence    Any gaps in refill history greater than 2 weeks in the last 3 months: no  Demonstrates understanding of importance of adherence: yes  Informant: patient  Reliability of informant: reliable  Provider-estimated medication adherence level: %  Reasons for non-adherence: no problems identified  Adherence tools used: alarm  Support network for adherence: family member          Follow-up: 1 month(s)     SANCHEZ HOWARD  Specialty Pharmacy Technician

## 2022-02-14 ENCOUNTER — SPECIALTY PHARMACY (OUTPATIENT)
Dept: ENDOCRINOLOGY | Age: 57
End: 2022-02-14

## 2022-02-14 NOTE — PROGRESS NOTES
Specialty Pharmacy Refill Coordination Note     Adarsh is a 57 y.o. male contacted today regarding refills of  1 specialty medication(s).    Reviewed and verified with patient:       Specialty medication(s) and dose(s) confirmed: yes    Refill Questions      Most Recent Value   Changes to allergies? No   Changes to medications? No   New conditions since last clinic visit No   Unplanned office visit, urgent care, ED, or hospital admission in the last 4 weeks  No   How does patient/caregiver feel medication is working? Very good   Financial problems or insurance changes  No   If yes, describe changes in insurance or financial issues. n/a   How many doses of your specialty medications were missed in the last 4 weeks? 0   Why were doses missed? n/a   Does this patient require a clinical escalation to a pharmacist? No          Delivery Questions      Most Recent Value   Delivery method [employer]Ex   Delivery address correct? Yes   Delivery phone number [phone]   Preferred delivery time? Anytime   Number of medications in delivery 1   Medication being filled and delivered jardiance   Doses left of specialty medications 0   Is there any medication that is due not being filled? No   Supplies needed? No supplies needed   Cooler needed? No   Do any medications need mixed or dated? No   Copay form of payment Credit card on file   Additional comments $10   Questions or concerns for the pharmacist? No   Explain any questions or concerns for the pharmacist n/a   Are any medications first time fills? No            Medication Adherence    Any gaps in refill history greater than 2 weeks in the last 3 months: no  Demonstrates understanding of importance of adherence: yes  Informant: patient  Reliability of informant: reliable  Provider-estimated medication adherence level: %  Reasons for non-adherence: no problems identified  Adherence tools used: alarm  Support network for adherence: family member          Follow-up: 1 month(s)      SANCHEZ HOWARD  Specialty Pharmacy Technician

## 2022-03-08 DIAGNOSIS — E11.9 CONTROLLED TYPE 2 DIABETES MELLITUS WITHOUT COMPLICATION, WITHOUT LONG-TERM CURRENT USE OF INSULIN: ICD-10-CM

## 2022-03-09 ENCOUNTER — SPECIALTY PHARMACY (OUTPATIENT)
Dept: ENDOCRINOLOGY | Age: 57
End: 2022-03-09

## 2022-03-09 RX ORDER — EMPAGLIFLOZIN 25 MG/1
25 TABLET, FILM COATED ORAL DAILY
Qty: 30 TABLET | Refills: 0 | Status: SHIPPED | OUTPATIENT
Start: 2022-03-09 | End: 2022-04-01 | Stop reason: SDUPTHER

## 2022-03-09 NOTE — PROGRESS NOTES
Specialty Pharmacy Refill Coordination Note     Adarsh is a 57 y.o. male contacted today regarding refills of  1 specialty medication(s).    Reviewed and verified with patient:         Specialty medication(s) and dose(s) confirmed: yes    Refill Questions    Flowsheet Row Most Recent Value   Changes to allergies? No   Changes to medications? No   New conditions since last clinic visit No   Unplanned office visit, urgent care, ED, or hospital admission in the last 4 weeks  No   How does patient/caregiver feel medication is working? Very good   Financial problems or insurance changes  No   If yes, describe changes in insurance or financial issues. n/a   How many doses of your specialty medications were missed in the last 4 weeks? 0   Why were doses missed? n/a   Does this patient require a clinical escalation to a pharmacist? No          Delivery Questions    Flowsheet Row Most Recent Value   Delivery method [employer]Ex   Delivery address correct? Yes   Preferred delivery time? Anytime   Number of medications in delivery 1   Medication being filled and delivered jardiance   Doses left of specialty medications 1 week   Is there any medication that is due not being filled? No   Supplies needed? No supplies needed   Cooler needed? No   Do any medications need mixed or dated? No   Copay form of payment Credit card on file   Additional comments $10   Questions or concerns for the pharmacist? No   Explain any questions or concerns for the pharmacist n/a   Are any medications first time fills? No            Medication Adherence    Any gaps in refill history greater than 2 weeks in the last 3 months: no  Demonstrates understanding of importance of adherence: yes  Informant: patient  Reliability of informant: reliable  Provider-estimated medication adherence level: %  Reasons for non-adherence: no problems identified  Adherence tools used: alarm  Support network for adherence: family member          Follow-up: 1 month(s)      SANCHEZ HOWARD  Specialty Pharmacy Technician

## 2022-04-01 DIAGNOSIS — E11.9 CONTROLLED TYPE 2 DIABETES MELLITUS WITHOUT COMPLICATION, WITHOUT LONG-TERM CURRENT USE OF INSULIN: ICD-10-CM

## 2022-04-01 RX ORDER — EMPAGLIFLOZIN 25 MG/1
25 TABLET, FILM COATED ORAL DAILY
Qty: 30 TABLET | Refills: 0 | Status: SHIPPED | OUTPATIENT
Start: 2022-04-01

## 2022-04-08 ENCOUNTER — SPECIALTY PHARMACY (OUTPATIENT)
Dept: ENDOCRINOLOGY | Age: 57
End: 2022-04-08

## 2022-05-10 DIAGNOSIS — R63.5 ABNORMAL WEIGHT GAIN: ICD-10-CM

## 2022-05-10 DIAGNOSIS — R79.89 LOW TESTOSTERONE: ICD-10-CM

## 2022-05-10 DIAGNOSIS — E03.9 HYPOTHYROIDISM, UNSPECIFIED TYPE: ICD-10-CM

## 2022-05-10 DIAGNOSIS — E78.5 HYPERLIPIDEMIA, UNSPECIFIED HYPERLIPIDEMIA TYPE: ICD-10-CM

## 2022-05-10 DIAGNOSIS — E78.5 DYSLIPIDEMIA WITH HIGH LDL AND LOW HDL: ICD-10-CM

## 2022-05-10 DIAGNOSIS — I10 ESSENTIAL HYPERTENSION: ICD-10-CM

## 2022-05-10 DIAGNOSIS — E11.9 CONTROLLED TYPE 2 DIABETES MELLITUS WITHOUT COMPLICATION, WITHOUT LONG-TERM CURRENT USE OF INSULIN: ICD-10-CM

## 2022-05-11 RX ORDER — LISINOPRIL 40 MG/1
TABLET ORAL
Qty: 30 TABLET | Refills: 0 | Status: SHIPPED | OUTPATIENT
Start: 2022-05-11 | End: 2022-06-26

## 2022-05-20 ENCOUNTER — DOCUMENTATION (OUTPATIENT)
Dept: PHARMACY | Facility: TELEHEALTH | Age: 57
End: 2022-05-20

## 2022-05-20 DIAGNOSIS — I10 ESSENTIAL HYPERTENSION: ICD-10-CM

## 2022-05-20 DIAGNOSIS — R63.5 ABNORMAL WEIGHT GAIN: ICD-10-CM

## 2022-05-20 DIAGNOSIS — E78.5 DYSLIPIDEMIA WITH HIGH LDL AND LOW HDL: ICD-10-CM

## 2022-05-20 DIAGNOSIS — E03.9 HYPOTHYROIDISM, UNSPECIFIED TYPE: ICD-10-CM

## 2022-05-20 DIAGNOSIS — E78.5 HYPERLIPIDEMIA, UNSPECIFIED HYPERLIPIDEMIA TYPE: ICD-10-CM

## 2022-05-20 DIAGNOSIS — R79.89 LOW TESTOSTERONE: ICD-10-CM

## 2022-05-20 DIAGNOSIS — E11.9 CONTROLLED TYPE 2 DIABETES MELLITUS WITHOUT COMPLICATION, WITHOUT LONG-TERM CURRENT USE OF INSULIN: ICD-10-CM

## 2022-05-23 RX ORDER — LISINOPRIL 40 MG/1
TABLET ORAL
Qty: 30 TABLET | Refills: 0 | OUTPATIENT
Start: 2022-05-23

## 2022-06-24 DIAGNOSIS — R63.5 ABNORMAL WEIGHT GAIN: ICD-10-CM

## 2022-06-24 DIAGNOSIS — I10 ESSENTIAL HYPERTENSION: ICD-10-CM

## 2022-06-24 DIAGNOSIS — E11.9 CONTROLLED TYPE 2 DIABETES MELLITUS WITHOUT COMPLICATION, WITHOUT LONG-TERM CURRENT USE OF INSULIN: ICD-10-CM

## 2022-06-24 DIAGNOSIS — E78.5 DYSLIPIDEMIA WITH HIGH LDL AND LOW HDL: ICD-10-CM

## 2022-06-24 DIAGNOSIS — E03.9 HYPOTHYROIDISM, UNSPECIFIED TYPE: ICD-10-CM

## 2022-06-24 DIAGNOSIS — E78.5 HYPERLIPIDEMIA, UNSPECIFIED HYPERLIPIDEMIA TYPE: ICD-10-CM

## 2022-06-24 DIAGNOSIS — R79.89 LOW TESTOSTERONE: ICD-10-CM

## 2022-06-26 RX ORDER — LISINOPRIL 40 MG/1
40 TABLET ORAL DAILY
Qty: 30 TABLET | Refills: 0 | Status: SHIPPED | OUTPATIENT
Start: 2022-06-26

## 2022-06-26 NOTE — TELEPHONE ENCOUNTER
Last refill.  Needs follow-up visit.  Last visit September 2021.  Patient canceled appointment in January 2022.  Please schedule follow-up visit with JAMIE Nam NP   Quality 110: Preventive Care And Screening: Influenza Immunization: Influenza Immunization previously received during influenza season Detail Level: Detailed

## 2022-06-26 NOTE — TELEPHONE ENCOUNTER
Rx Refill Note  Requested Prescriptions     Pending Prescriptions Disp Refills    lisinopril (PRINIVIL,ZESTRIL) 40 MG tablet [Pharmacy Med Name: LISINOPRIL 40 MG TABLET] 30 tablet 0     Sig: TAKE ONE TABLET BY MOUTH DAILY      Last office visit with prescribing clinician: Visit date not found      Next office visit with prescribing clinician: Visit date not found            Tawana Summers  06/26/22, 12:53 EDT

## 2022-07-01 ENCOUNTER — SPECIALTY PHARMACY (OUTPATIENT)
Dept: ENDOCRINOLOGY | Age: 57
End: 2022-07-01

## 2022-07-09 DIAGNOSIS — E78.5 DYSLIPIDEMIA WITH HIGH LDL AND LOW HDL: ICD-10-CM

## 2022-07-09 DIAGNOSIS — E03.9 HYPOTHYROIDISM, UNSPECIFIED TYPE: ICD-10-CM

## 2022-07-09 DIAGNOSIS — E11.9 CONTROLLED TYPE 2 DIABETES MELLITUS WITHOUT COMPLICATION, WITHOUT LONG-TERM CURRENT USE OF INSULIN: ICD-10-CM

## 2022-07-09 DIAGNOSIS — E78.5 HYPERLIPIDEMIA, UNSPECIFIED HYPERLIPIDEMIA TYPE: ICD-10-CM

## 2022-07-09 DIAGNOSIS — R63.5 ABNORMAL WEIGHT GAIN: ICD-10-CM

## 2022-07-09 DIAGNOSIS — I10 ESSENTIAL HYPERTENSION: ICD-10-CM

## 2022-07-09 DIAGNOSIS — R79.89 LOW TESTOSTERONE: ICD-10-CM

## 2022-07-18 RX ORDER — LISINOPRIL 40 MG/1
TABLET ORAL
Qty: 30 TABLET | Refills: 0 | OUTPATIENT
Start: 2022-07-18

## 2022-07-28 ENCOUNTER — DOCUMENTATION (OUTPATIENT)
Dept: ENDOCRINOLOGY | Age: 57
End: 2022-07-28

## 2022-07-28 NOTE — PROGRESS NOTES
Specialty Pharmacy Patient Management Program  Program Disenrollment      Adarsh Davison is a 57 y.o. male with Type 2 Diabetes seen by an Endocrinology provider at [facility]. Patient has been enrolled in the Endocrinology Patient Management program offered by [facility].      Disenrollment letter sent to patient today following at least 10 attempts by a pharmacist. Will allow 1 week for patient to respond to letter. If patient has not reached out at that time, he will be disenrolled from the program.    Patricia Bautista, PharmD  7/28/2022  09:03 EDT

## 2022-07-30 DIAGNOSIS — E78.5 DYSLIPIDEMIA WITH HIGH LDL AND LOW HDL: ICD-10-CM

## 2022-07-30 DIAGNOSIS — E78.5 HYPERLIPIDEMIA, UNSPECIFIED HYPERLIPIDEMIA TYPE: ICD-10-CM

## 2022-07-30 DIAGNOSIS — R63.5 ABNORMAL WEIGHT GAIN: ICD-10-CM

## 2022-07-30 DIAGNOSIS — I10 ESSENTIAL HYPERTENSION: ICD-10-CM

## 2022-07-30 DIAGNOSIS — E11.9 CONTROLLED TYPE 2 DIABETES MELLITUS WITHOUT COMPLICATION, WITHOUT LONG-TERM CURRENT USE OF INSULIN: ICD-10-CM

## 2022-07-30 DIAGNOSIS — E03.9 HYPOTHYROIDISM, UNSPECIFIED TYPE: ICD-10-CM

## 2022-07-30 DIAGNOSIS — R79.89 LOW TESTOSTERONE: ICD-10-CM

## 2022-08-02 RX ORDER — LISINOPRIL 40 MG/1
TABLET ORAL
Qty: 30 TABLET | Refills: 0 | OUTPATIENT
Start: 2022-08-02

## 2022-08-02 NOTE — TELEPHONE ENCOUNTER
Rx Refill Note  Requested Prescriptions     Pending Prescriptions Disp Refills    lisinopril (PRINIVIL,ZESTRIL) 40 MG tablet [Pharmacy Med Name: LISINOPRIL 40 MG TABLET] 30 tablet 0     Sig: TAKE ONE TABLET BY MOUTH DAILY. LAST REFILL NEEDS FOLLOW-UP VISIT.      Last office visit with prescribing clinician: 10 mo's ago   Next office visit with prescribing clinician: Visit date not found            Tawana Summers  08/02/22, 11:34 EDT

## 2022-08-02 NOTE — TELEPHONE ENCOUNTER
Last visit was September 2021.  He does not have any follow-up visit scheduled.  Patient canceled previous appointment

## 2022-08-03 NOTE — PROGRESS NOTES
This encounter was opened during an attempted outreach.  Have been unable to reach patient for about 3 months.  Clinic pharmacist sent disenrollment letter to patient on 07/28/22.  Please see Patricia Bautista PharmD, endocrine clinic pharmacist encounter from 07/28/22.    Nidhi Sales PharmD  8/3/2022  12:45 EDT

## 2022-08-05 ENCOUNTER — SPECIALTY PHARMACY (OUTPATIENT)
Dept: ENDOCRINOLOGY | Age: 57
End: 2022-08-05

## 2022-08-05 DIAGNOSIS — R63.5 ABNORMAL WEIGHT GAIN: ICD-10-CM

## 2022-08-05 DIAGNOSIS — I10 ESSENTIAL HYPERTENSION: ICD-10-CM

## 2022-08-05 DIAGNOSIS — E78.5 HYPERLIPIDEMIA, UNSPECIFIED HYPERLIPIDEMIA TYPE: ICD-10-CM

## 2022-08-05 DIAGNOSIS — R79.89 LOW TESTOSTERONE: ICD-10-CM

## 2022-08-05 DIAGNOSIS — E03.9 HYPOTHYROIDISM, UNSPECIFIED TYPE: ICD-10-CM

## 2022-08-05 DIAGNOSIS — E78.5 DYSLIPIDEMIA WITH HIGH LDL AND LOW HDL: ICD-10-CM

## 2022-08-05 DIAGNOSIS — E11.9 CONTROLLED TYPE 2 DIABETES MELLITUS WITHOUT COMPLICATION, WITHOUT LONG-TERM CURRENT USE OF INSULIN: ICD-10-CM

## 2022-08-05 NOTE — PROGRESS NOTES
Specialty Pharmacy Patient Management Program  Program Disenrollment      Adarsh Davison is a 57 y.o. male with Type 2 Diabetes seen by an Endocrinology provider at [facility]. Patient was previously enrolled in the Endocrinology Patient Management program offered by [facility].      Disenrolling patient today as we have been unable to reach him after at least 10 phone call attempts and a letter. It has been a pleasure taking part in this patients care.    Patricia Bautista, PharmD   8/5/2022  13:58 EDT

## 2022-08-10 RX ORDER — LISINOPRIL 40 MG/1
TABLET ORAL
Qty: 30 TABLET | Refills: 0 | OUTPATIENT
Start: 2022-08-10

## 2023-12-04 NOTE — TELEPHONE ENCOUNTER
----- Message from JUAN Mcgarry sent at 7/20/2018 10:59 AM EDT -----  I changed to injection, call in rx. I added thyroid u/s  ----- Message -----  From: Kadi Mcknight MA  Sent: 7/20/2018   9:53 AM  To: JUAN Mcgarry    I spoke to him and he is not sure about doing the androgel, he is wanting to do injections. He also mentioned that you had mentioned sending him for a thyroid U/S and would like to know if you are going to send him for that.   ----- Message -----  From: JUAN Mcgarry  Sent: 7/19/2018   9:25 AM  To: Kadi Mcknight MA    Will need hany before sending in rx for androgel. See if he wants this therapy. Let him know about other changes. thanks      Spoke to the patient and informed him [facility] switched testosterone to injection and put in for the thyroid U/S. Told patient they would be contacting him to schedule.   
45-year-old male with past medical history of depression, suicide attempt, vertigo presenting with right shoulder and right knee pain.  Patient reports right shoulder pain for the past 2 weeks.  He thinks he might of hurt it at work.  Patient further reports right knee pain after bumping his knee into a pole earlier today.  Patient has not taken anything for the pain.  Patient has no other complaints at this time.